# Patient Record
Sex: MALE | Race: WHITE | Employment: FULL TIME
[De-identification: names, ages, dates, MRNs, and addresses within clinical notes are randomized per-mention and may not be internally consistent; named-entity substitution may affect disease eponyms.]

---

## 2017-02-02 ENCOUNTER — OFFICE VISIT (OUTPATIENT)
Dept: FAMILY MEDICINE CLINIC | Facility: CLINIC | Age: 32
End: 2017-02-02

## 2017-02-02 VITALS
BODY MASS INDEX: 35.22 KG/M2 | WEIGHT: 246 LBS | DIASTOLIC BLOOD PRESSURE: 80 MMHG | OXYGEN SATURATION: 98 % | TEMPERATURE: 98.3 F | HEIGHT: 70 IN | SYSTOLIC BLOOD PRESSURE: 124 MMHG | HEART RATE: 70 BPM

## 2017-02-02 DIAGNOSIS — R20.0 LEFT LEG NUMBNESS: ICD-10-CM

## 2017-02-02 DIAGNOSIS — S86.912A MUSCLE STRAIN OF LOWER LEG, LEFT, INITIAL ENCOUNTER: Primary | ICD-10-CM

## 2017-02-02 PROCEDURE — 99213 OFFICE O/P EST LOW 20 MIN: CPT | Performed by: NURSE PRACTITIONER

## 2017-02-02 RX ORDER — MELOXICAM 15 MG/1
15 TABLET ORAL DAILY
Qty: 14 TABLET | Refills: 0 | Status: SHIPPED | OUTPATIENT
Start: 2017-02-02 | End: 2017-03-30 | Stop reason: ALTCHOICE

## 2017-02-03 ENCOUNTER — TELEPHONE (OUTPATIENT)
Dept: FAMILY MEDICINE CLINIC | Facility: CLINIC | Age: 32
End: 2017-02-03

## 2017-03-13 ENCOUNTER — HOSPITAL ENCOUNTER (OUTPATIENT)
Dept: PHYSICAL THERAPY | Age: 32
Setting detail: THERAPIES SERIES
Discharge: HOME | End: 2017-03-13
Payer: COMMERCIAL

## 2017-03-13 PROCEDURE — 97140 MANUAL THERAPY 1/> REGIONS: CPT

## 2017-03-13 PROCEDURE — 97110 THERAPEUTIC EXERCISES: CPT

## 2017-03-30 ENCOUNTER — OFFICE VISIT (OUTPATIENT)
Dept: FAMILY MEDICINE CLINIC | Age: 32
End: 2017-03-30
Payer: COMMERCIAL

## 2017-03-30 VITALS
HEART RATE: 64 BPM | TEMPERATURE: 98.4 F | BODY MASS INDEX: 34.5 KG/M2 | SYSTOLIC BLOOD PRESSURE: 110 MMHG | OXYGEN SATURATION: 97 % | DIASTOLIC BLOOD PRESSURE: 80 MMHG | HEIGHT: 70 IN | WEIGHT: 241 LBS

## 2017-03-30 DIAGNOSIS — G57.12 MERALGIA PARESTHETICA, LEFT: Primary | ICD-10-CM

## 2017-03-30 PROCEDURE — 99213 OFFICE O/P EST LOW 20 MIN: CPT | Performed by: NURSE PRACTITIONER

## 2017-03-30 RX ORDER — PREDNISONE 20 MG/1
40 TABLET ORAL DAILY
Qty: 10 TABLET | Refills: 0 | Status: SHIPPED | OUTPATIENT
Start: 2017-03-30 | End: 2017-04-04

## 2017-03-30 ASSESSMENT — ENCOUNTER SYMPTOMS
DIARRHEA: 0
CONSTIPATION: 0
BLOOD IN STOOL: 0

## 2017-04-03 ENCOUNTER — TELEPHONE (OUTPATIENT)
Dept: NEUROLOGY | Age: 32
End: 2017-04-03

## 2017-04-10 ENCOUNTER — OFFICE VISIT (OUTPATIENT)
Dept: NEUROLOGY | Age: 32
End: 2017-04-10
Payer: COMMERCIAL

## 2017-04-10 VITALS
RESPIRATION RATE: 18 BRPM | BODY MASS INDEX: 34.65 KG/M2 | SYSTOLIC BLOOD PRESSURE: 132 MMHG | HEART RATE: 76 BPM | DIASTOLIC BLOOD PRESSURE: 81 MMHG | HEIGHT: 70 IN | WEIGHT: 242 LBS

## 2017-04-10 DIAGNOSIS — R20.2 PARESTHESIA OF LEFT LEG: ICD-10-CM

## 2017-04-10 PROCEDURE — 99214 OFFICE O/P EST MOD 30 MIN: CPT | Performed by: PSYCHIATRY & NEUROLOGY

## 2017-04-11 ENCOUNTER — HOSPITAL ENCOUNTER (OUTPATIENT)
Age: 32
Discharge: HOME | End: 2017-04-11
Payer: COMMERCIAL

## 2017-04-11 DIAGNOSIS — R20.2 PARESTHESIA OF LEFT LEG: ICD-10-CM

## 2017-04-11 LAB
ABSOLUTE EOS #: 0.2 K/UL (ref 0–0.4)
ABSOLUTE LYMPH #: 2.2 K/UL (ref 0.9–2.5)
ABSOLUTE MONO #: 0.5 K/UL (ref 0–1)
ALBUMIN SERPL-MCNC: 4.2 G/DL (ref 3.5–5.2)
ALBUMIN/GLOBULIN RATIO: ABNORMAL (ref 1–2.5)
ALP BLD-CCNC: 74 U/L (ref 40–129)
ALT SERPL-CCNC: <5 U/L (ref 5–41)
ANION GAP SERPL CALCULATED.3IONS-SCNC: 12 MMOL/L (ref 9–17)
AST SERPL-CCNC: 16 U/L
BASOPHILS # BLD: 0 % (ref 0–2)
BASOPHILS ABSOLUTE: 0 K/UL (ref 0–0.2)
BILIRUB SERPL-MCNC: 1.45 MG/DL (ref 0.3–1.2)
BUN BLDV-MCNC: 18 MG/DL (ref 6–20)
BUN/CREAT BLD: 16 (ref 9–20)
CALCIUM SERPL-MCNC: 9.3 MG/DL (ref 8.6–10.4)
CHLORIDE BLD-SCNC: 101 MMOL/L (ref 98–107)
CO2: 27 MMOL/L (ref 20–31)
CREAT SERPL-MCNC: 1.13 MG/DL (ref 0.7–1.2)
DIFFERENTIAL TYPE: YES
EOSINOPHILS RELATIVE PERCENT: 2 % (ref 0–5)
ESTIMATED AVERAGE GLUCOSE: 103 MG/DL
FOLATE: 15.4 NG/ML
GFR AFRICAN AMERICAN: >60 ML/MIN
GFR NON-AFRICAN AMERICAN: >60 ML/MIN
GFR SERPL CREATININE-BSD FRML MDRD: ABNORMAL ML/MIN/{1.73_M2}
GFR SERPL CREATININE-BSD FRML MDRD: ABNORMAL ML/MIN/{1.73_M2}
GLUCOSE BLD-MCNC: 92 MG/DL (ref 70–99)
HAV IGM SER IA-ACNC: NONREACTIVE
HBA1C MFR BLD: 5.2 % (ref 4.8–5.9)
HCT VFR BLD CALC: 45.7 % (ref 41–53)
HEMOGLOBIN: 15.4 G/DL (ref 13.5–17.5)
HEPATITIS B CORE IGM ANTIBODY: NONREACTIVE
HEPATITIS B SURFACE ANTIGEN: NONREACTIVE
HEPATITIS C ANTIBODY: NONREACTIVE
LYMPHOCYTES # BLD: 31 % (ref 13–44)
MCH RBC QN AUTO: 29 PG (ref 26–34)
MCHC RBC AUTO-ENTMCNC: 33.8 G/DL (ref 31–37)
MCV RBC AUTO: 85.8 FL (ref 80–100)
MONOCYTES # BLD: 7 % (ref 5–9)
PDW BLD-RTO: 13.5 % (ref 12.1–15.2)
PLATELET # BLD: 215 K/UL (ref 140–450)
PLATELET ESTIMATE: NORMAL
PMV BLD AUTO: NORMAL FL (ref 6–12)
POTASSIUM SERPL-SCNC: 4.4 MMOL/L (ref 3.7–5.3)
RBC # BLD: 5.32 M/UL (ref 4.5–5.9)
RBC # BLD: NORMAL 10*6/UL
SEG NEUTROPHILS: 60 % (ref 39–75)
SEGMENTED NEUTROPHILS ABSOLUTE COUNT: 4.2 K/UL (ref 2.1–6.5)
SODIUM BLD-SCNC: 140 MMOL/L (ref 135–144)
TOTAL PROTEIN: 6.7 G/DL (ref 6.4–8.3)
TSH SERPL DL<=0.05 MIU/L-ACNC: 1.94 MIU/L (ref 0.3–5)
VITAMIN B-12: 513 PG/ML (ref 211–946)
WBC # BLD: 7.2 K/UL (ref 3.5–11)
WBC # BLD: NORMAL 10*3/UL

## 2017-04-11 PROCEDURE — 80053 COMPREHEN METABOLIC PANEL: CPT

## 2017-04-11 PROCEDURE — 84207 ASSAY OF VITAMIN B-6: CPT

## 2017-04-11 PROCEDURE — 82746 ASSAY OF FOLIC ACID SERUM: CPT

## 2017-04-11 PROCEDURE — 82607 VITAMIN B-12: CPT

## 2017-04-11 PROCEDURE — 83036 HEMOGLOBIN GLYCOSYLATED A1C: CPT

## 2017-04-11 PROCEDURE — 82652 VIT D 1 25-DIHYDROXY: CPT

## 2017-04-11 PROCEDURE — 80074 ACUTE HEPATITIS PANEL: CPT

## 2017-04-11 PROCEDURE — 84443 ASSAY THYROID STIM HORMONE: CPT

## 2017-04-11 PROCEDURE — 85025 COMPLETE CBC W/AUTO DIFF WBC: CPT

## 2017-04-11 PROCEDURE — 36415 COLL VENOUS BLD VENIPUNCTURE: CPT

## 2017-04-13 LAB — VITAMIN D 1,25-DIHYDROXY: 50.1 PG/ML (ref 19.9–79.3)

## 2017-04-16 LAB — VITAMIN B6: 109 NMOL/L (ref 20–125)

## 2017-04-18 ENCOUNTER — HOSPITAL ENCOUNTER (OUTPATIENT)
Dept: MRI IMAGING | Age: 32
Discharge: HOME | End: 2017-04-18
Payer: COMMERCIAL

## 2017-04-18 DIAGNOSIS — R20.2 PARESTHESIA OF LEFT LEG: ICD-10-CM

## 2017-04-18 DIAGNOSIS — R20.0 NUMBNESS IN LEFT LEG: ICD-10-CM

## 2017-04-18 PROCEDURE — 6360000004 HC RX CONTRAST MEDICATION: Performed by: PSYCHIATRY & NEUROLOGY

## 2017-04-18 PROCEDURE — A9579 GAD-BASE MR CONTRAST NOS,1ML: HCPCS | Performed by: PSYCHIATRY & NEUROLOGY

## 2017-04-18 PROCEDURE — 72158 MRI LUMBAR SPINE W/O & W/DYE: CPT

## 2017-04-18 RX ADMIN — GADOPENTETATE DIMEGLUMINE 20 ML: 469.01 INJECTION INTRAVENOUS at 14:39

## 2019-05-24 ENCOUNTER — OFFICE VISIT (OUTPATIENT)
Dept: PRIMARY CARE CLINIC | Age: 34
End: 2019-05-24
Payer: COMMERCIAL

## 2019-05-24 VITALS
TEMPERATURE: 98 F | OXYGEN SATURATION: 94 % | SYSTOLIC BLOOD PRESSURE: 136 MMHG | WEIGHT: 234 LBS | DIASTOLIC BLOOD PRESSURE: 86 MMHG | BODY MASS INDEX: 33.58 KG/M2 | HEART RATE: 70 BPM

## 2019-05-24 DIAGNOSIS — J06.9 VIRAL UPPER RESPIRATORY TRACT INFECTION: Primary | ICD-10-CM

## 2019-05-24 PROCEDURE — 99213 OFFICE O/P EST LOW 20 MIN: CPT | Performed by: NURSE PRACTITIONER

## 2019-05-24 ASSESSMENT — ENCOUNTER SYMPTOMS
CHEST TIGHTNESS: 0
RHINORRHEA: 1
SHORTNESS OF BREATH: 0
SINUS PAIN: 0
WHEEZING: 0
NAUSEA: 0
SORE THROAT: 1
VOMITING: 0
SINUS PRESSURE: 0
DIARRHEA: 0
COUGH: 1
ABDOMINAL PAIN: 0

## 2019-05-24 NOTE — PROGRESS NOTES
[address] [facility] WALK-IN [facility] [id number]  Dept: [phone]  Dept Fax: [phone]     Krys Jones is a 26 y.o. male who presents to the [facility] in Care today for hismedical conditions/complaints as noted below. Krys Jones is c/o of Pharyngitis (sore throat, cough, left ear pain, sinus pressure x 4 days)      HPI:     Pharyngitis   This is a new problem. The current episode started in the past 7 days (Started 4 days ago with sore throat, dry cough, nasal congestion, runny nose with pressure in eyes and \"popping in left ear\". Reports that one of his children was sick but is doing better now. ). The problem occurs constantly. The problem has been gradually worsening. Associated symptoms include congestion, coughing, fatigue (last couple of days), headaches and a sore throat. Pertinent negatives include no abdominal pain, chest pain, chills, diaphoresis, fever, myalgias, nausea, rash or vomiting. Associated symptoms comments: Sore on tongue, doesn't hurt but bleeds at times after brushing tongue. . The symptoms are aggravated by drinking and eating. Treatments tried: Dayquil for couple of days. The treatment provided mild relief. History reviewed. No pertinent past medical history. Current Outpatient Medications   Medication Sig Dispense Refill    Pseudoephedrine-DM-GG 60- MG TABS Take 1 tablet by mouth every 6 hours as needed (For cough, congestion and/or sinus pressure.) 28 tablet 0    Multiple Vitamins-Minerals (MULTIVITAMIN PO) Take by mouth       No current facility-administered medications for this visit. No Known Allergies    Subjective:     Review of Systems   Constitutional: Positive for appetite change and fatigue (last couple of days). Negative for chills, diaphoresis and fever. HENT: Positive for congestion, mouth sores (sore on tongue), postnasal drip, rhinorrhea and sore throat.  Negative for ear discharge, ear pain, sinus pressure and sinus pain. Respiratory: Positive for cough. Negative for chest tightness, shortness of breath and wheezing. Cardiovascular: Negative for chest pain. Gastrointestinal: Negative for abdominal pain, diarrhea, nausea and vomiting. Musculoskeletal: Negative for myalgias. Skin: Negative for rash and wound. Neurological: Positive for headaches. Negative for dizziness and light-headedness. Objective:      Physical Exam   Constitutional: He is oriented to person, place, and time. Vital signs are normal. He appears well-developed and well-nourished. He is cooperative. He does not appear ill. No distress. HENT:   Head: Normocephalic and atraumatic. Right Ear: Hearing, external ear and ear canal normal. No mastoid tenderness. Tympanic membrane is not injected, not erythematous and not bulging. A middle ear effusion (clear fluid with bubbles) is present. Left Ear: Hearing, external ear and ear canal normal. No mastoid tenderness. Tympanic membrane is not injected, not erythematous and not bulging. A middle ear effusion (pale white fluid with bubbles) is present. Nose: Mucosal edema and rhinorrhea present. Right sinus exhibits no maxillary sinus tenderness and no frontal sinus tenderness. Left sinus exhibits no maxillary sinus tenderness and no frontal sinus tenderness. Mouth/Throat: Uvula is midline, oropharynx is clear and moist and mucous membranes are normal. Oral lesions (small lesion in center of tongue, no bleeding.) present. No oropharyngeal exudate, posterior oropharyngeal edema, posterior oropharyngeal erythema or tonsillar abscesses. Eyes: Pupils are equal, round, and reactive to light. Conjunctivae are normal. Right eye exhibits no discharge. Left eye exhibits no discharge. Cardiovascular: Normal rate, regular rhythm, S1 normal, S2 normal, normal heart sounds and intact distal pulses. Exam reveals no gallop and no friction rub.    No murmur heard.  Pulmonary/Chest: Effort normal and breath sounds normal. No accessory muscle usage. No respiratory distress. He has no decreased breath sounds. He has no wheezes. He has no rhonchi. He has no rales. Occasional dry cough. Breath sounds clear B/L anterior and posterior lobes. Chest expansion symmetrical.  No audible wheezing or respiratory distress. No rales or rhonchi. Musculoskeletal: Normal range of motion. Lymphadenopathy:     He has no cervical adenopathy. Right cervical: No superficial cervical and no posterior cervical adenopathy present. Left cervical: No superficial cervical and no posterior cervical adenopathy present. Neurological: He is alert and oriented to person, place, and time. Skin: Skin is warm and dry. No rash noted. He is not diaphoretic. No erythema. No pallor. Psychiatric: He has a normal mood and affect. His behavior is normal.   Nursing note and vitals reviewed. /86   Pulse 70   Temp 98 °F (36.7 °C)   Wt 234 lb (106.1 kg)   SpO2 94%   BMI 33.58 kg/m²     Assessment:      Diagnosis Orders   1. Viral upper respiratory tract infection  Pseudoephedrine-DM-GG 60- MG TABS       Plan:      Return if symptoms worsen or fail to improve, for Resume all previous medications as directed. Orders Placed This Encounter   Medications    Pseudoephedrine-DM-GG 60- MG TABS     Sig: Take 1 tablet by mouth every 6 hours as needed (For cough, congestion and/or sinus pressure.)     Dispense:  28 tablet     Refill:  0      · Practice meticulous handwashing and cover cough to prevent spread of infection  · Encouraged to increase fluids and rest  · Tylenol/Ibuprofen OTC PRN for pain, discomfort or fever as directed on package  · Capmist DM as prescribed as needed for cough, congestion, sinus pain or pressure.   · Warm salt water gargles for sore throat  · Cool mist humidifier  · Hot tea with honey and lemon for cough PRN  · Patient instructions given

## 2019-05-24 NOTE — PATIENT INSTRUCTIONS
SURVEY:    You may be receiving a survey from [employer] regarding your visit today. Please complete the survey to enable us to provide the highest quality of care to you and your family. If you cannot score us a very good on any question, please call the office to discuss how we could of made your experience a very good one. Thank you. Patient Education        Upper Respiratory Infection (Cold): Care Instructions  Your Care Instructions    An upper respiratory infection, or URI, is an infection of the nose, sinuses, or throat. URIs are spread by coughs, sneezes, and direct contact. The common cold is the most frequent kind of URI. The flu and sinus infections are other kinds of URIs. Almost all URIs are caused by viruses. Antibiotics won't cure them. But you can treat most infections with home care. This may include drinking lots of fluids and taking over-the-counter pain medicine. You will probably feel better in 4 to 10 days. The doctor has checked you carefully, but problems can develop later. If you notice any problems or new symptoms, get medical treatment right away. Follow-up care is a key part of your treatment and safety. Be sure to make and go to all appointments, and call your doctor if you are having problems. It's also a good idea to know your test results and keep a list of the medicines you take. How can you care for yourself at home? · To prevent dehydration, drink plenty of fluids, enough so that your urine is light yellow or clear like water. Choose water and other caffeine-free clear liquids until you feel better. If you have kidney, heart, or liver disease and have to limit fluids, talk with your doctor before you increase the amount of fluids you drink. · Take an over-the-counter pain medicine, such as acetaminophen (Tylenol), ibuprofen (Advil, Motrin), or naproxen (Aleve). Read and follow all instructions on the label.   · Before you use cough and cold medicines, check the of this information. · Practice meticulous handwashing and cover cough to prevent spread of infection  · Encouraged to increase fluids and rest  · Tylenol/Ibuprofen OTC PRN for pain, discomfort or fever as directed on package  · Capmist DM as prescribed as needed for cough, congestion, sinus pain or pressure. · Warm salt water gargles for sore throat  · Cool mist humidifier  · Hot tea with honey and lemon for cough PRN  · Patient instructions given for upper respiratory infection. · To ER or call 911 if any difficulty breathing, shortness of breath, inability to swallow, hives, rash, facial/tongue swelling or temp greater than 103 degrees. · Follow up with PCP or Walk in Care as needed if symptoms worsen or do not improve.

## 2019-08-15 ENCOUNTER — OFFICE VISIT (OUTPATIENT)
Dept: FAMILY MEDICINE CLINIC | Age: 34
End: 2019-08-15
Payer: COMMERCIAL

## 2019-08-15 VITALS
DIASTOLIC BLOOD PRESSURE: 80 MMHG | SYSTOLIC BLOOD PRESSURE: 122 MMHG | TEMPERATURE: 98.3 F | HEART RATE: 64 BPM | BODY MASS INDEX: 32.64 KG/M2 | HEIGHT: 70 IN | WEIGHT: 228 LBS | OXYGEN SATURATION: 98 %

## 2019-08-15 DIAGNOSIS — S86.912A STRAIN OF LEFT KNEE, INITIAL ENCOUNTER: Primary | ICD-10-CM

## 2019-08-15 DIAGNOSIS — M54.16 LUMBAR RADICULOPATHY: ICD-10-CM

## 2019-08-15 PROCEDURE — 99213 OFFICE O/P EST LOW 20 MIN: CPT | Performed by: NURSE PRACTITIONER

## 2019-08-15 RX ORDER — MELOXICAM 7.5 MG/1
7.5 TABLET ORAL DAILY
Qty: 90 TABLET | Refills: 1 | Status: SHIPPED | OUTPATIENT
Start: 2019-08-15 | End: 2020-06-30 | Stop reason: ALTCHOICE

## 2019-08-15 ASSESSMENT — PATIENT HEALTH QUESTIONNAIRE - PHQ9
SUM OF ALL RESPONSES TO PHQ QUESTIONS 1-9: 0
2. FEELING DOWN, DEPRESSED OR HOPELESS: 0
SUM OF ALL RESPONSES TO PHQ QUESTIONS 1-9: 0
SUM OF ALL RESPONSES TO PHQ9 QUESTIONS 1 & 2: 0
1. LITTLE INTEREST OR PLEASURE IN DOING THINGS: 0

## 2019-08-15 ASSESSMENT — ENCOUNTER SYMPTOMS
BOWEL INCONTINENCE: 0
BACK PAIN: 1
COUGH: 0
DIARRHEA: 0
NAUSEA: 0
SHORTNESS OF BREATH: 0
VOMITING: 0

## 2019-08-15 NOTE — PROGRESS NOTES
Medication Sig Start Date End Date Taking? Authorizing Provider   meloxicam (MOBIC) 7.5 MG tablet Take 1 tablet by mouth daily 8/15/19  Yes Kaity Vaughn, APRN - CNP   Multiple Vitamins-Minerals (MULTIVITAMIN PO) Take by mouth    Historical Provider, MD        Allergies:       Patient has no known allergies. Social History:     Tobacco:    reports that he has never smoked. He has never used smokeless tobacco.  Alcohol:      reports that he drinks alcohol. Drug Use:  reports that he does not use drugs. Family History:      No family history on file. Review of Systems:         Review of Systems   Constitutional: Negative for chills and fever. Respiratory: Negative for cough and shortness of breath. Cardiovascular: Negative for chest pain and palpitations. Gastrointestinal: Negative for bowel incontinence, diarrhea, nausea and vomiting. Genitourinary: Negative for bladder incontinence. Musculoskeletal: Positive for back pain. Neurological: Negative for dizziness, tingling, seizures, numbness and headaches. Physical Exam:     Vitals:  /80   Pulse 64   Temp 98.3 °F (36.8 °C) (Oral)   Ht 5' 10\" (1.778 m)   Wt 228 lb (103.4 kg)   SpO2 98%   BMI 32.71 kg/m²       Physical Exam   Constitutional: Vital signs are normal. He appears well-developed and well-nourished. He is cooperative. Cardiovascular: Normal rate, regular rhythm, S1 normal and S2 normal.   Pulmonary/Chest: Effort normal and breath sounds normal. No respiratory distress. Abdominal: Soft. Bowel sounds are normal. There is no tenderness. Musculoskeletal:        Left knee: He exhibits no LCL laxity and no MCL laxity. No MCL, no LCL and no patellar tendon tenderness noted. Left patella not loose or mobile. Neg Maldonado test.    Skin: Skin is warm, dry and intact. Nursing note and vitals reviewed.             Data:     Lab Results   Component Value Date     04/11/2017    K 4.4 04/11/2017     have questions about a medical condition or this instruction, always ask your healthcare professional. [address] any warranty or liability for your use of this information. Patient Education        Low Back Pain: Exercises  Introduction  Here are some examples of exercises for you to try. The exercises may be suggested for a condition or for rehabilitation. Start each exercise slowly. Ease off the exercises if you start to have pain. You will be told when to start these exercises and which ones will work best for you. How to do the exercises  Press-up    1. Lie on your stomach, supporting your body with your forearms. 2. Press your elbows down into the floor to raise your upper back. As you do this, relax your stomach muscles and allow your back to arch without using your back muscles. As your press up, do not let your hips or pelvis come off the floor. 3. Hold for 15 to 30 seconds, then relax. 4. Repeat 2 to 4 times. Alternate arm and leg (bird dog) exercise    1. Start on the floor, on your hands and knees. 2. Tighten your belly muscles. 3. Raise one leg off the floor, and hold it straight out behind you. Be careful not to let your hip drop down, because that will twist your trunk. 4. Hold for about 6 seconds, then lower your leg and switch to the other leg. 5. Repeat 8 to 12 times on each leg. 6. Over time, work up to holding for 10 to 30 seconds each time. 7. If you feel stable and secure with your leg raised, try raising the opposite arm straight out in front of you at the same time. Knee-to-chest exercise    1. Lie on your back with your knees bent and your feet flat on the floor. 2. Bring one knee to your chest, keeping the other foot flat on the floor (or keeping the other leg straight, whichever feels better on your lower back). 3. Keep your lower back pressed to the floor. Hold for at least 15 to 30 seconds.   4. Relax, and lower the knee to the starting 15 to 30 seconds. Do not arch your back, point your toes, or bend either knee. Keep one heel touching the floor and the other heel touching the wall. 4. Repeat with your other leg. 5. Do 2 to 4 times for each leg. Hip flexor stretch    1. Kneel on the floor with one knee bent and one leg behind you. Place your forward knee over your foot. Keep your other knee touching the floor. 2. Slowly push your hips forward until you feel a stretch in the upper thigh of your rear leg. 3. Hold the stretch for at least 15 to 30 seconds. Repeat with your other leg. 4. Do 2 to 4 times on each side. Wall sit    1. Stand with your back 10 to 12 inches away from a wall. 2. Lean into the wall until your back is flat against it. 3. Slowly slide down until your knees are slightly bent, pressing your lower back into the wall. 4. Hold for about 6 seconds, then slide back up the wall. 5. Repeat 8 to 12 times. Follow-up care is a key part of your treatment and safety. Be sure to make and go to all appointments, and call your doctor if you are having problems. It's also a good idea to know your test results and keep a list of the medicines you take. Where can you learn more? Go to [email or web address]. org and sign in to your [employer] account. Enter I899 in the [facility] box to learn more about \"Low Back Pain: Exercises. \"     If you do not have an account, please click on the \"Sign Up Now\" link. Current as of: September 20, 2018  Content Version: 12.1  © 7764-5585 Healthwise, Incorporated. Care instructions adapted under license by [facility] ([facility]). If you have questions about a medical condition or this instruction, always ask your healthcare professional. [place] [address] any warranty or liability for your use of this information.              Electronically signed by GARO Berry CNP on 8/16/2019 at 5:39 PM           Completed Refills      Requested Prescriptions

## 2019-08-15 NOTE — PATIENT INSTRUCTIONS
SURVEY:    You may be receiving a survey from [employer] regarding your visit today. Please complete the survey to enable us to provide the highest quality of care to you and your family. If you cannot score us a very good on any question, please call the office to discuss how we could have made your experience a very good one. Thank you. Patient Education        Lateral Collateral Ligament Sprain: Rehab Exercises  Introduction  Here are some examples of exercises for you to try. The exercises may be suggested for a condition or for rehabilitation. Start each exercise slowly. Ease off the exercises if you start to have pain. You will be told when to start these exercises and which ones will work best for you. How to do the exercises  Knee flexion with heel slide    1. Lie on your back with your knees bent. 2. Slide your heel back by bending your affected knee as far as you can. Then hook your other foot around your ankle to help pull your heel even farther back. 3. Hold for about 6 seconds, then rest for up to 10 seconds. 4. Repeat 8 to 12 times. Heel slides on a wall    1. Lie on the floor close enough to a wall so that you can place both legs up on the wall. Your hips should be as close to the wall as is comfortable for you. 2. Start with both feet resting on the wall. Slowly let the foot of your affected leg slide down the wall until you feel a stretch in your knee. 3. Hold for 15 to 30 seconds. 4. Then slowly slide your foot up to where you started. 5. Repeat 2 to 4 times. Quad sets    1. Sit with your affected leg straight and supported on the floor or a firm bed. Place a small, rolled-up towel under your knee. Your other leg should be bent, with that foot flat on the floor. 2. Tighten the thigh muscles of your affected leg by pressing the back of your knee down into the towel. 3. Hold for about 6 seconds, then rest for up to 10 seconds. 4. Repeat 8 to 12 times.     Short-arc quad    1. Lie on your back with your knees bent over a foam roll or a large rolled-up towel. 2. Lift the lower part of your affected leg and straighten your knee by tightening your thigh muscle. Keep the bottom of your knee on the foam roll or rolled-up towel. 3. Hold your knee straight for about 6 seconds, then slowly bend your knee and lower your leg back to the floor. Rest for up to 10 seconds between repetitions. 4. Repeat 8 to 12 times. Straight-leg raises to the front    1. Lie on your back with your good knee bent so that your foot rests flat on the floor. Your affected leg should be straight. Make sure that your low back has a normal curve. You should be able to slip your hand in between the floor and the small of your back, with your palm touching the floor and your back touching the back of your hand. 2. Tighten the thigh muscles in your affected leg by pressing the back of your knee flat down to the floor. Hold your knee straight. 3. Keeping the thigh muscles tight and your leg straight, lift your affected leg up so that your heel is about 12 inches off the floor. Hold for about 6 seconds, then lower slowly. 4. Relax for up to 10 seconds between repetitions. 5. Repeat 8 to 12 times. Hamstring set (heel dig)    1. Sit with your affected leg bent. Your good leg should be straight and supported on the floor. 2. Tighten the muscles on the back of your bent leg (hamstring) by pressing your heel into the floor. 3. Hold for about 6 seconds, then rest for up to 10 seconds. 4. Repeat 8 to 12 times. Hip adduction    1. Sit on the floor with your knees bent. 2. Place a pillow between your knees. 3. Put your hands slightly behind your hips for support. 4. Squeeze the pillow by tightening the muscles on the inside of your thighs. 5. Hold for 6 seconds, then rest for up to 10 seconds. 6. Repeat 8 to 12 times. Hip abduction    1.  Sit on the floor with your affected knee close to a more about \"Lateral Collateral Ligament Sprain: Rehab Exercises. \"     If you do not have an account, please click on the \"Sign Up Now\" link. Current as of: September 20, 2018  Content Version: 12.1  © 9521-7460 Healthwise, Incorporated. Care instructions adapted under license by [facility] ([facility]). If you have questions about a medical condition or this instruction, always ask your healthcare professional. [address] any warranty or liability for your use of this information. Patient Education        Low Back Pain: Exercises  Introduction  Here are some examples of exercises for you to try. The exercises may be suggested for a condition or for rehabilitation. Start each exercise slowly. Ease off the exercises if you start to have pain. You will be told when to start these exercises and which ones will work best for you. How to do the exercises  Press-up    1. Lie on your stomach, supporting your body with your forearms. 2. Press your elbows down into the floor to raise your upper back. As you do this, relax your stomach muscles and allow your back to arch without using your back muscles. As your press up, do not let your hips or pelvis come off the floor. 3. Hold for 15 to 30 seconds, then relax. 4. Repeat 2 to 4 times. Alternate arm and leg (bird dog) exercise    1. Start on the floor, on your hands and knees. 2. Tighten your belly muscles. 3. Raise one leg off the floor, and hold it straight out behind you. Be careful not to let your hip drop down, because that will twist your trunk. 4. Hold for about 6 seconds, then lower your leg and switch to the other leg. 5. Repeat 8 to 12 times on each leg. 6. Over time, work up to holding for 10 to 30 seconds each time. 7. If you feel stable and secure with your leg raised, try raising the opposite arm straight out in front of you at the same time. Knee-to-chest exercise    1.  Lie on your back with your knees bent and your feet this instruction, always ask your healthcare professional. [place] [address] any warranty or liability for your use of this information.

## 2020-06-29 ENCOUNTER — TELEPHONE (OUTPATIENT)
Dept: FAMILY MEDICINE CLINIC | Age: 35
End: 2020-06-29

## 2020-06-29 NOTE — TELEPHONE ENCOUNTER
Pt calling in asking for advice on what to do. Pt went to an event on Saturday and he was informed this morning that someone tested positive that was there, pt said that he is only concerned because he has a 11year old niece that he saw on Sunday and she has cystic fibrosis so he really doesn't want to get her sick. Patient is not sure if it is to early to test him or if he even needs the testing he is just curious.   Please advise  Pt is also scheduled for in office appointment tomorrow should I change to virtual?

## 2020-06-30 ENCOUNTER — OFFICE VISIT (OUTPATIENT)
Dept: FAMILY MEDICINE CLINIC | Age: 35
End: 2020-06-30
Payer: COMMERCIAL

## 2020-06-30 VITALS
WEIGHT: 239 LBS | HEART RATE: 60 BPM | BODY MASS INDEX: 34.29 KG/M2 | SYSTOLIC BLOOD PRESSURE: 124 MMHG | DIASTOLIC BLOOD PRESSURE: 82 MMHG | OXYGEN SATURATION: 98 %

## 2020-06-30 PROCEDURE — G8427 DOCREV CUR MEDS BY ELIG CLIN: HCPCS | Performed by: NURSE PRACTITIONER

## 2020-06-30 PROCEDURE — 99213 OFFICE O/P EST LOW 20 MIN: CPT | Performed by: NURSE PRACTITIONER

## 2020-06-30 PROCEDURE — 1036F TOBACCO NON-USER: CPT | Performed by: NURSE PRACTITIONER

## 2020-06-30 PROCEDURE — G8419 CALC BMI OUT NRM PARAM NOF/U: HCPCS | Performed by: NURSE PRACTITIONER

## 2020-06-30 RX ORDER — MELOXICAM 15 MG/1
15 TABLET ORAL DAILY
Qty: 90 TABLET | Refills: 1 | Status: SHIPPED | OUTPATIENT
Start: 2020-06-30 | End: 2020-09-29 | Stop reason: SDUPTHER

## 2020-06-30 SDOH — ECONOMIC STABILITY: TRANSPORTATION INSECURITY
IN THE PAST 12 MONTHS, HAS THE LACK OF TRANSPORTATION KEPT YOU FROM MEDICAL APPOINTMENTS OR FROM GETTING MEDICATIONS?: NO

## 2020-06-30 SDOH — ECONOMIC STABILITY: TRANSPORTATION INSECURITY
IN THE PAST 12 MONTHS, HAS LACK OF TRANSPORTATION KEPT YOU FROM MEETINGS, WORK, OR FROM GETTING THINGS NEEDED FOR DAILY LIVING?: NO

## 2020-06-30 SDOH — ECONOMIC STABILITY: FOOD INSECURITY: WITHIN THE PAST 12 MONTHS, YOU WORRIED THAT YOUR FOOD WOULD RUN OUT BEFORE YOU GOT MONEY TO BUY MORE.: NEVER TRUE

## 2020-06-30 SDOH — ECONOMIC STABILITY: INCOME INSECURITY: HOW HARD IS IT FOR YOU TO PAY FOR THE VERY BASICS LIKE FOOD, HOUSING, MEDICAL CARE, AND HEATING?: NOT HARD AT ALL

## 2020-06-30 SDOH — ECONOMIC STABILITY: FOOD INSECURITY: WITHIN THE PAST 12 MONTHS, THE FOOD YOU BOUGHT JUST DIDN'T LAST AND YOU DIDN'T HAVE MONEY TO GET MORE.: NEVER TRUE

## 2020-06-30 ASSESSMENT — ENCOUNTER SYMPTOMS
BOWEL INCONTINENCE: 0
DIARRHEA: 0
VOMITING: 0
COUGH: 0
NAUSEA: 0
SHORTNESS OF BREATH: 0
BACK PAIN: 1

## 2020-06-30 ASSESSMENT — PATIENT HEALTH QUESTIONNAIRE - PHQ9
SUM OF ALL RESPONSES TO PHQ QUESTIONS 1-9: 0
2. FEELING DOWN, DEPRESSED OR HOPELESS: 0
1. LITTLE INTEREST OR PLEASURE IN DOING THINGS: 0
SUM OF ALL RESPONSES TO PHQ QUESTIONS 1-9: 0
SUM OF ALL RESPONSES TO PHQ9 QUESTIONS 1 & 2: 0

## 2020-06-30 NOTE — PROGRESS NOTES
HPI Notes    Name: Merceda Libman  : 1985         Chief Complaint:     Chief Complaint   Patient presents with    Back Pain     Here today for chronic back pain, needs [facility] paperwork renewed. History of Present Illness:        Back Pain   This is a chronic problem. The current episode started more than 1 year ago. The problem occurs intermittently. The pain is present in the lumbar spine. The quality of the pain is described as aching. The pain does not radiate. The pain is moderate. The pain is worse during the day. The symptoms are aggravated by bending, position and twisting. Associated symptoms include leg pain (off and on). Pertinent negatives include no bladder incontinence, bowel incontinence, chest pain, fever, headaches, numbness, perianal numbness or tingling. Risk factors include obesity. He has tried nothing for the symptoms. Past Medical History:     No past medical history on file. Reviewed all health maintenance requirements and ordered appropriate tests  Health Maintenance Due   Topic Date Due    Varicella vaccine (1 of 2 - 2-dose childhood series) 1984    HIV screen  1998    DTaP/Tdap/Td vaccine (1 - Tdap) 2002       Past Surgical History:     Past Surgical History:   Procedure Laterality Date    WRIST SURGERY Right         Medications:       Prior to Admission medications    Medication Sig Start Date End Date Taking? Authorizing Provider   meloxicam (MOBIC) 15 MG tablet Take 1 tablet by mouth daily 20  Yes Ernesto Angulo APRN - CNP   Multiple Vitamins-Minerals (MULTIVITAMIN PO) Take by mouth   Yes Historical Provider, MD        Allergies:       Patient has no known allergies. Social History:     Tobacco:    reports that he has never smoked. He has never used smokeless tobacco.  Alcohol:      reports current alcohol use. Drug Use:  reports no history of drug use. Family History:      No family history on file.     Review of Systems:

## 2020-09-29 ENCOUNTER — OFFICE VISIT (OUTPATIENT)
Dept: FAMILY MEDICINE CLINIC | Age: 35
End: 2020-09-29
Payer: COMMERCIAL

## 2020-09-29 VITALS
WEIGHT: 244 LBS | SYSTOLIC BLOOD PRESSURE: 124 MMHG | HEIGHT: 70 IN | BODY MASS INDEX: 34.93 KG/M2 | OXYGEN SATURATION: 98 % | HEART RATE: 64 BPM | DIASTOLIC BLOOD PRESSURE: 79 MMHG | TEMPERATURE: 97.9 F

## 2020-09-29 PROCEDURE — 99213 OFFICE O/P EST LOW 20 MIN: CPT | Performed by: NURSE PRACTITIONER

## 2020-09-29 PROCEDURE — G8419 CALC BMI OUT NRM PARAM NOF/U: HCPCS | Performed by: NURSE PRACTITIONER

## 2020-09-29 PROCEDURE — 1036F TOBACCO NON-USER: CPT | Performed by: NURSE PRACTITIONER

## 2020-09-29 PROCEDURE — G8427 DOCREV CUR MEDS BY ELIG CLIN: HCPCS | Performed by: NURSE PRACTITIONER

## 2020-09-29 RX ORDER — MELOXICAM 15 MG/1
15 TABLET ORAL DAILY
Qty: 90 TABLET | Refills: 1 | Status: SHIPPED | OUTPATIENT
Start: 2020-09-29 | End: 2021-03-11 | Stop reason: ALTCHOICE

## 2020-09-29 RX ORDER — TIZANIDINE 4 MG/1
4 TABLET ORAL NIGHTLY PRN
Qty: 30 TABLET | Refills: 0 | Status: SHIPPED | OUTPATIENT
Start: 2020-09-29 | End: 2021-03-11 | Stop reason: ALTCHOICE

## 2020-09-29 ASSESSMENT — ENCOUNTER SYMPTOMS
COUGH: 0
SHORTNESS OF BREATH: 0
BOWEL INCONTINENCE: 0
VOMITING: 0
BACK PAIN: 1
NAUSEA: 0
DIARRHEA: 0

## 2020-09-29 NOTE — PATIENT INSTRUCTIONS
SURVEY:    You may be receiving a survey from [employer] regarding your visit today. Please complete the survey to enable us to provide the highest quality of care to you and your family. If you cannot score us a very good on any question, please call the office to discuss how we could of made your experience a very good one. Thank you.

## 2020-09-29 NOTE — PROGRESS NOTES
HPI Notes    Name: Maulik Gallo  : 1985         Chief Complaint:     Chief Complaint   Patient presents with    Back Pain     Patient present today with lower back pain on left side pain started yesterday patient stated he was taking trash out        History of Present Illness:        Back Pain   This is a new problem. The current episode started yesterday. The problem occurs daily. The pain is present in the lumbar spine. The pain is moderate. The symptoms are aggravated by bending and twisting. Pertinent negatives include no bladder incontinence, bowel incontinence, chest pain, fever, headaches, leg pain, numbness, perianal numbness or tingling. He has tried nothing for the symptoms. Past Medical History:     No past medical history on file. Reviewed all health maintenance requirements and ordered appropriate tests  Health Maintenance Due   Topic Date Due    Varicella vaccine (1 of 2 - 2-dose childhood series) 1984    HIV screen  1998    DTaP/Tdap/Td vaccine (1 - Tdap) 2002    Flu vaccine (1) 2020       Past Surgical History:     Past Surgical History:   Procedure Laterality Date    WRIST SURGERY Right         Medications:       Prior to Admission medications    Medication Sig Start Date End Date Taking? Authorizing Provider   Multiple Vitamins-Minerals (MULTIVITAMIN PO) Take by mouth   Yes Historical Provider, MD   meloxicam (MOBIC) 15 MG tablet Take 1 tablet by mouth daily  Patient not taking: Reported on 2020   GARO Peterson CNP        Allergies:       Patient has no known allergies. Social History:     Tobacco:    reports that he has never smoked. He has never used smokeless tobacco.  Alcohol:      reports current alcohol use. Drug Use:  reports no history of drug use. Family History:      No family history on file. Review of Systems:         Review of Systems   Constitutional: Negative for chills and fever. Respiratory: Negative for cough and shortness of breath. Cardiovascular: Negative for chest pain and palpitations. Gastrointestinal: Negative for bowel incontinence, diarrhea, nausea and vomiting. Genitourinary: Negative for bladder incontinence. Musculoskeletal: Positive for back pain. Neurological: Negative for dizziness, tingling, seizures, numbness and headaches. Physical Exam:     Vitals:  /79 (Site: Right Upper Arm, Position: Sitting, Cuff Size: Large Adult)   Pulse 64   Temp 97.9 °F (36.6 °C) (Oral)   Ht 5' 10\" (1.778 m)   Wt 244 lb (110.7 kg)   SpO2 98%   BMI 35.01 kg/m²       Physical Exam  Vitals signs and nursing note reviewed. Constitutional:       Appearance: He is well-developed. Cardiovascular:      Rate and Rhythm: Normal rate and regular rhythm. Heart sounds: S1 normal and S2 normal.   Pulmonary:      Effort: Pulmonary effort is normal. No respiratory distress. Breath sounds: Normal breath sounds. Abdominal:      General: Bowel sounds are normal.      Palpations: Abdomen is soft. Tenderness: There is no abdominal tenderness. Musculoskeletal:      Lumbar back: He exhibits decreased range of motion, tenderness, pain and spasm. Back:       Comments:  tenderness to left paraspinal muscles   increased pain with forward bend at 40 degrees   increased pain with backward bend at 10 degrees  NO increase in pain with side to side bend  NO increased pain with truncal twisting  NO increased pain with straight leg raised test bilat  Foot and ankle strength 5/5 bilat  Knee raise strength 5/5 bilat   Skin:     General: Skin is warm and dry. Psychiatric:         Behavior: Behavior is cooperative.                Data:     Lab Results   Component Value Date     04/11/2017    K 4.4 04/11/2017     04/11/2017    CO2 27 04/11/2017    BUN 18 04/11/2017    CREATININE 1.13 04/11/2017    GLUCOSE 92 04/11/2017    PROT 6.7 04/11/2017    LABALBU 4.2 04/11/2017    BILITOT 1.45 04/11/2017    ALKPHOS 74 04/11/2017    AST 16 04/11/2017    ALT <5 04/11/2017     Lab Results   Component Value Date    WBC 7.2 04/11/2017    RBC 5.32 04/11/2017    HGB 15.4 04/11/2017    HCT 45.7 04/11/2017    MCV 85.8 04/11/2017    MCH 29.0 04/11/2017    MCHC 33.8 04/11/2017    RDW 13.5 04/11/2017     04/11/2017    MPV NOT REPORTED 04/11/2017     Lab Results   Component Value Date    TSH 1.94 04/11/2017     Lab Results   Component Value Date    LABA1C 5.2 04/11/2017          Assessment & Plan        Diagnosis Orders   1. Strain of lumbar paraspinous muscle, initial encounter       Will start patient on tizanidine at bedtime and meloxicam daily. Pt given some exercises to do at home. Pt educated about proper back safety and lifting techniques. Patient verbalizes understanding and agreement with plan. All questions answered. If symptoms do not resolve or worsen, return to office. Completed Refills   Requested Prescriptions      No prescriptions requested or ordered in this encounter     No follow-ups on file. No orders of the defined types were placed in this encounter. No orders of the defined types were placed in this encounter. Patient Instructions     SURVEY:    You may be receiving a survey from [employer] regarding your visit today. Please complete the survey to enable us to provide the highest quality of care to you and your family. If you cannot score us a very good on any question, please call the office to discuss how we could of made your experience a very good one. Thank you.         Electronically signed by GARO Kurtz CNP on 9/29/2020 at 9:06 AM           Completed Refills      Requested Prescriptions      No prescriptions requested or ordered in this encounter         Remi Saint received counseling on the following healthy behaviors: nutrition, exercise and medication adherence  Reviewed prior labs and health maintenance. Continue current medications, diet and exercise. Discussed use, benefit, and side effects of prescribed medications. Barriers to medication compliance addressed. Patient given educational materials - see patient instructions. All patient questions answered. Patient voiced understanding.

## 2021-03-11 ENCOUNTER — TELEPHONE (OUTPATIENT)
Dept: FAMILY MEDICINE CLINIC | Age: 36
End: 2021-03-11

## 2021-03-11 ENCOUNTER — OFFICE VISIT (OUTPATIENT)
Dept: FAMILY MEDICINE CLINIC | Age: 36
End: 2021-03-11
Payer: COMMERCIAL

## 2021-03-11 ENCOUNTER — HOSPITAL ENCOUNTER (OUTPATIENT)
Dept: PREADMISSION TESTING | Age: 36
Setting detail: SPECIMEN
Discharge: HOME | End: 2021-03-11
Payer: COMMERCIAL

## 2021-03-11 VITALS
SYSTOLIC BLOOD PRESSURE: 110 MMHG | DIASTOLIC BLOOD PRESSURE: 78 MMHG | WEIGHT: 242 LBS | OXYGEN SATURATION: 97 % | HEART RATE: 80 BPM | TEMPERATURE: 98.7 F | BODY MASS INDEX: 34.72 KG/M2

## 2021-03-11 DIAGNOSIS — R52 BODY ACHES: ICD-10-CM

## 2021-03-11 DIAGNOSIS — Z20.822 SUSPECTED COVID-19 VIRUS INFECTION: Primary | ICD-10-CM

## 2021-03-11 LAB
BILIRUBIN, POC: NORMAL
BLOOD URINE, POC: NORMAL
CLARITY, POC: CLEAR
COLOR, POC: YELLOW
GLUCOSE URINE, POC: NORMAL
KETONES, POC: NORMAL
LEUKOCYTE EST, POC: NORMAL
NITRITE, POC: NORMAL
PH, POC: 6
PROTEIN, POC: NORMAL
SARS-COV-2, RAPID: NOT DETECTED
SPECIFIC GRAVITY, POC: 1.02
SPECIMEN DESCRIPTION: NORMAL
UROBILINOGEN, POC: 0.2

## 2021-03-11 PROCEDURE — 1036F TOBACCO NON-USER: CPT | Performed by: NURSE PRACTITIONER

## 2021-03-11 PROCEDURE — G8427 DOCREV CUR MEDS BY ELIG CLIN: HCPCS | Performed by: NURSE PRACTITIONER

## 2021-03-11 PROCEDURE — C9803 HOPD COVID-19 SPEC COLLECT: HCPCS

## 2021-03-11 PROCEDURE — 99213 OFFICE O/P EST LOW 20 MIN: CPT | Performed by: NURSE PRACTITIONER

## 2021-03-11 PROCEDURE — G8417 CALC BMI ABV UP PARAM F/U: HCPCS | Performed by: NURSE PRACTITIONER

## 2021-03-11 PROCEDURE — G8484 FLU IMMUNIZE NO ADMIN: HCPCS | Performed by: NURSE PRACTITIONER

## 2021-03-11 PROCEDURE — U0002 COVID-19 LAB TEST NON-CDC: HCPCS

## 2021-03-11 PROCEDURE — 81002 URINALYSIS NONAUTO W/O SCOPE: CPT | Performed by: NURSE PRACTITIONER

## 2021-03-11 ASSESSMENT — PATIENT HEALTH QUESTIONNAIRE - PHQ9
SUM OF ALL RESPONSES TO PHQ QUESTIONS 1-9: 0
SUM OF ALL RESPONSES TO PHQ QUESTIONS 1-9: 0
SUM OF ALL RESPONSES TO PHQ9 QUESTIONS 1 & 2: 0

## 2021-03-11 ASSESSMENT — ENCOUNTER SYMPTOMS
BACK PAIN: 1
BOWEL INCONTINENCE: 0

## 2021-03-11 NOTE — TELEPHONE ENCOUNTER
Adis Mayo said he got his test results back and they were negative. He is wanting to know what his next step is. Should he treat this like a cold or does he need something called in for him. He is just concerned with being sick and being around his mom, wife, and kids. Please let Adis Maoy know. Health Maintenance   Topic Date Due    Varicella vaccine (1 of 2 - 2-dose childhood series) Never done    HIV screen  Never done    DTaP/Tdap/Td vaccine (1 - Tdap) Never done    Flu vaccine (1) Never done    Hepatitis C screen  Completed    Hepatitis A vaccine  Aged Out    Hepatitis B vaccine  Aged Out    Hib vaccine  Aged Out    Meningococcal (ACWY) vaccine  Aged Out    Pneumococcal 0-64 years Vaccine  Aged Out             (applicable per patient's age: Cancer Screenings, Depression Screening, Fall Risk Screening, Immunizations)    Hemoglobin A1C (%)   Date Value   04/11/2017 5.2     AST (U/L)   Date Value   04/11/2017 16     ALT (U/L)   Date Value   04/11/2017 <5 (L)     BUN (mg/dL)   Date Value   04/11/2017 18      (goal A1C is < 7)   (goal LDL is <100) need 30-50% reduction from baseline     BP Readings from Last 3 Encounters:   03/11/21 110/78   09/29/20 124/79   06/30/20 124/82    (goal /80)      All Future Testing planned in [facility]:  Lab Frequency Next Occurrence   COVID-19 Once 03/11/2021       Next Visit Date:  No future appointments.          Patient Active Problem List:     Paresthesia of left leg

## 2021-03-11 NOTE — TELEPHONE ENCOUNTER
----- Message from GARO Dickerson CNP sent at 3/11/2021  1:16 PM EST -----  Please let pt know that his covid test was neg. There is a possibility that it is too early in the process to be detected by the rapid test. Continue to monitor symptoms. Continue to treat symptoms with OTC medications.      Sudafed 12HR 120mg twice daily (nasal decongestant)  Flonase 1 spray each nostril twice daily (nasal steroid)  Zyrtec 10mg Daily OR Claritin 10mg Daily (antihistamine)  Ibuprofen 3 times a day (antiinflammatory)   Warm tea with 1tbsp honey (soothes the throat)  Increase water intake  Rest

## 2021-03-11 NOTE — PROGRESS NOTES
HPI Notes    Name: El Sanchez  : 1985         Chief Complaint:     Chief Complaint   Patient presents with    Back Pain     Patient complains of lower back pain started 2 days ago. Dull pain, tender to the touch.  Fever     Fever up to 100.2 a couple days ago. Some sinus pressure, nasal congestion.  Concern For COVID-19       History of Present Illness:        Back Pain  This is a new problem. The current episode started in the past 7 days. The problem occurs daily. The pain is present in the lumbar spine. The quality of the pain is described as aching. The pain is mild. The symptoms are aggravated by position. Associated symptoms include a fever. Pertinent negatives include no bladder incontinence, bowel incontinence, leg pain, numbness, perianal numbness or tingling. Fever   This is a new problem. The current episode started in the past 7 days. Associated symptoms include muscle aches. Past Medical History:     No past medical history on file. Reviewed all health maintenance requirements and ordered appropriate tests  Health Maintenance Due   Topic Date Due    Varicella vaccine (1 of 2 - 2-dose childhood series) Never done    HIV screen  Never done    DTaP/Tdap/Td vaccine (1 - Tdap) Never done    Flu vaccine (1) Never done       Past Surgical History:     Past Surgical History:   Procedure Laterality Date    WRIST SURGERY Right         Medications:       Prior to Admission medications    Medication Sig Start Date End Date Taking? Authorizing Provider   Multiple Vitamins-Minerals (MULTIVITAMIN PO) Take by mouth   Yes Historical Provider, MD        Allergies:       Patient has no known allergies. Social History:     Tobacco:    reports that he has never smoked. He has never used smokeless tobacco.  Alcohol:      reports current alcohol use. Drug Use:  reports no history of drug use. Family History:      No family history on file.     Review of Systems:         Review of Systems   Constitutional: Positive for fever. Gastrointestinal: Negative for bowel incontinence. Genitourinary: Negative for bladder incontinence. Musculoskeletal: Positive for back pain. Neurological: Negative for tingling and numbness. Physical Exam:     Vitals:  /78   Pulse 80   Temp 98.7 °F (37.1 °C) (Oral)   Wt 242 lb (109.8 kg)   SpO2 97%   BMI 34.72 kg/m²       Physical Exam  Vitals signs and nursing note reviewed. Constitutional:       Appearance: He is well-developed. HENT:      Right Ear: Tympanic membrane normal.      Left Ear: Tympanic membrane normal.      Nose: Mucosal edema present. Mouth/Throat:      Pharynx: Posterior oropharyngeal erythema present. Cardiovascular:      Rate and Rhythm: Normal rate and regular rhythm. Heart sounds: Normal heart sounds. Pulmonary:      Effort: Pulmonary effort is normal. No respiratory distress. Breath sounds: Normal breath sounds. Musculoskeletal:      Thoracic back: He exhibits tenderness. Neurological:      Mental Status: He is alert. Psychiatric:         Behavior: Behavior is cooperative. Data:     Lab Results   Component Value Date     04/11/2017    K 4.4 04/11/2017     04/11/2017    CO2 27 04/11/2017    BUN 18 04/11/2017    CREATININE 1.13 04/11/2017    GLUCOSE 92 04/11/2017    PROT 6.7 04/11/2017    LABALBU 4.2 04/11/2017    BILITOT 1.45 04/11/2017    ALKPHOS 74 04/11/2017    AST 16 04/11/2017    ALT <5 04/11/2017     Lab Results   Component Value Date    WBC 7.2 04/11/2017    RBC 5.32 04/11/2017    HGB 15.4 04/11/2017    HCT 45.7 04/11/2017    MCV 85.8 04/11/2017    MCH 29.0 04/11/2017    MCHC 33.8 04/11/2017    RDW 13.5 04/11/2017     04/11/2017    MPV NOT REPORTED 04/11/2017     Lab Results   Component Value Date    TSH 1.94 04/11/2017     Lab Results   Component Value Date    LABA1C 5.2 04/11/2017          Assessment & Plan        Diagnosis Orders   1.  Suspected COVID-19 virus infection  POCT Urinalysis no Micro    COVID-19   2. Body aches       Will send for covid testing. Pt educated about quarantining until results are back. May treat symptoms. Patient verbalizes understanding and agreement with plan. All questions answered. If symptoms do not resolve or worsen, return to office. Completed Refills   Requested Prescriptions      No prescriptions requested or ordered in this encounter     No follow-ups on file. No orders of the defined types were placed in this encounter. Orders Placed This Encounter   Procedures    COVID-19     Standing Status:   Future     Standing Expiration Date:   3/11/2022     Scheduling Instructions:      1) Due to current limited availability of the COVID-19 test, tests will be prioritized based on responses to questions above. Testing may be delayed due to volume. 2) Print and instruct patient to adhere to CDC home isolation program. (Link Above)              3) Set up or refer patient for a monitoring program.              4) Have patient sign up for and leverage MyChart (if not previously done). Order Specific Question:   Is this test for diagnosis or screening? Answer:   Diagnosis of ill patient     Order Specific Question:   Symptomatic for COVID-19 as defined by CDC? Answer:   Yes     Order Specific Question:   Date of Symptom Onset     Answer:   3/9/2021     Order Specific Question:   Hospitalized for COVID-19? Answer:   No     Order Specific Question:   Admitted to ICU for COVID-19? Answer:   No     Order Specific Question:   Employed in healthcare setting? Answer:   No     Order Specific Question:   Resident in a congregate (group) care setting? Answer:   No     Order Specific Question:   Pregnant: Answer:   No     Order Specific Question:   Previously tested for COVID-19?      Answer:   No    POCT Urinalysis no Micro         Patient Instructions   SURVEY:    You may be receiving a survey from [employer] regarding your visit today. Please complete the survey to enable us to provide the highest quality of care to you and your family. If you cannot score us a very good (5 Stars) on any question, please call the office to discuss how we could have made your experience a very good one. Thank you. Clinical Care Team: DI Terrell LPN    Clerical Team: Memo Gomes        Electronically signed by GARO Terrell CNP on 3/11/2021 at 9:52 AM           Completed Refills      Requested Prescriptions      No prescriptions requested or ordered in this encounter         Mayte Nash received counseling on the following healthy behaviors: medication adherence  Reviewed prior labs and health maintenance. Continue current medications, diet and exercise. Discussed use, benefit, and side effects of prescribed medications. Barriers to medication compliance addressed. Patient given educational materials - see patient instructions. All patient questions answered. Patient voiced understanding.

## 2021-09-02 ENCOUNTER — OFFICE VISIT (OUTPATIENT)
Dept: FAMILY MEDICINE CLINIC | Age: 36
End: 2021-09-02
Payer: COMMERCIAL

## 2021-09-02 VITALS
SYSTOLIC BLOOD PRESSURE: 114 MMHG | WEIGHT: 242 LBS | DIASTOLIC BLOOD PRESSURE: 80 MMHG | BODY MASS INDEX: 34.72 KG/M2 | TEMPERATURE: 98.1 F | HEART RATE: 64 BPM | OXYGEN SATURATION: 99 %

## 2021-09-02 DIAGNOSIS — M54.16 LUMBAR RADICULOPATHY: Primary | ICD-10-CM

## 2021-09-02 PROCEDURE — G8427 DOCREV CUR MEDS BY ELIG CLIN: HCPCS | Performed by: NURSE PRACTITIONER

## 2021-09-02 PROCEDURE — 1036F TOBACCO NON-USER: CPT | Performed by: NURSE PRACTITIONER

## 2021-09-02 PROCEDURE — G8417 CALC BMI ABV UP PARAM F/U: HCPCS | Performed by: NURSE PRACTITIONER

## 2021-09-02 PROCEDURE — 99213 OFFICE O/P EST LOW 20 MIN: CPT | Performed by: NURSE PRACTITIONER

## 2021-09-02 SDOH — ECONOMIC STABILITY: FOOD INSECURITY: WITHIN THE PAST 12 MONTHS, YOU WORRIED THAT YOUR FOOD WOULD RUN OUT BEFORE YOU GOT MONEY TO BUY MORE.: NEVER TRUE

## 2021-09-02 SDOH — ECONOMIC STABILITY: FOOD INSECURITY: WITHIN THE PAST 12 MONTHS, THE FOOD YOU BOUGHT JUST DIDN'T LAST AND YOU DIDN'T HAVE MONEY TO GET MORE.: NEVER TRUE

## 2021-09-02 ASSESSMENT — SOCIAL DETERMINANTS OF HEALTH (SDOH): HOW HARD IS IT FOR YOU TO PAY FOR THE VERY BASICS LIKE FOOD, HOUSING, MEDICAL CARE, AND HEATING?: NOT HARD AT ALL

## 2021-09-02 ASSESSMENT — ENCOUNTER SYMPTOMS
VOMITING: 0
NAUSEA: 0
DIARRHEA: 0
SHORTNESS OF BREATH: 0
BOWEL INCONTINENCE: 0
BACK PAIN: 1
COUGH: 0

## 2021-09-02 NOTE — PATIENT INSTRUCTIONS
SURVEY:    You may be receiving a survey from [employer] regarding your visit today. Please complete the survey to enable us to provide the highest quality of care to you and your family. If you cannot score us a very good (5 Stars) on any question, please call the office to discuss how we could have made your experience a very good one. Thank you.     Clinical Care Team: GARO Soria-CRISTINE Boland LPN    Clerical Team: Matthieu Romero

## 2021-09-02 NOTE — PROGRESS NOTES
HPI Notes    Name: Gordon Medellin  : 1985         Chief Complaint:     Chief Complaint   Patient presents with    Back Pain     Patient here today for chronic back pain, needs renewal on [place] papers. History of Present Illness:        Back Pain  This is a chronic problem. The current episode started more than 1 year ago. The problem has been waxing and waning since onset. The pain is present in the lumbar spine. The quality of the pain is described as aching. The pain is mild. Pertinent negatives include no bladder incontinence, bowel incontinence, chest pain, fever, headaches, paresis or paresthesias. Risk factors include obesity. He has tried NSAIDs and home exercises for the symptoms. The treatment provided mild relief. Pt has been doing some exercise to keep his back loose. Misses about 1 day a month due to back pain. Past Medical History:     No past medical history on file. Reviewed all health maintenance requirements and ordered appropriate tests  Health Maintenance Due   Topic Date Due    Varicella vaccine (1 of 2 - 2-dose childhood series) Never done    COVID-19 Vaccine (1) Never done    HIV screen  Never done    DTaP/Tdap/Td vaccine (1 - Tdap) Never done    Flu vaccine (1) Never done       Past Surgical History:     Past Surgical History:   Procedure Laterality Date    WRIST SURGERY Right         Medications:       Prior to Admission medications    Medication Sig Start Date End Date Taking? Authorizing Provider   Multiple Vitamins-Minerals (MULTIVITAMIN PO) Take by mouth   Yes Historical Provider, MD        Allergies:       Patient has no known allergies. Social History:     Tobacco:    reports that he has never smoked. He has never used smokeless tobacco.  Alcohol:      reports current alcohol use. Drug Use:  reports no history of drug use. Family History:      No family history on file.     Review of Systems:         Review of Systems   Constitutional: Negative for chills and fever. Respiratory: Negative for cough and shortness of breath. Cardiovascular: Negative for chest pain and palpitations. Gastrointestinal: Negative for bowel incontinence, diarrhea, nausea and vomiting. Genitourinary: Negative for bladder incontinence. Musculoskeletal: Positive for back pain. Neurological: Negative for dizziness, seizures, headaches and paresthesias. Physical Exam:     Vitals:  /80   Pulse 64   Temp 98.1 °F (36.7 °C) (Oral)   Wt 242 lb (109.8 kg)   SpO2 99%   BMI 34.72 kg/m²       Physical Exam  Vitals and nursing note reviewed. Constitutional:       Appearance: He is well-developed. Cardiovascular:      Rate and Rhythm: Normal rate and regular rhythm. Heart sounds: S1 normal and S2 normal.   Pulmonary:      Effort: Pulmonary effort is normal. No respiratory distress. Breath sounds: Normal breath sounds. Abdominal:      General: Bowel sounds are normal.      Palpations: Abdomen is soft. Tenderness: There is no abdominal tenderness. Musculoskeletal:      Lumbar back: Tenderness and bony tenderness present. Normal range of motion. Comments: NO tenderness to paraspinal muscles  NO increased pain with forward bend  NO increased pain with backward bend  NO increase in pain with side to side bend  NO increased pain with truncal twisting  NO increased pain with straight leg raised test bilat  Foot and ankle strength 5/5 bilat  Knee raise strength 5/5 bilat     Skin:     General: Skin is warm and dry. Psychiatric:         Behavior: Behavior is cooperative.                Data:     Lab Results   Component Value Date     04/11/2017    K 4.4 04/11/2017     04/11/2017    CO2 27 04/11/2017    BUN 18 04/11/2017    CREATININE 1.13 04/11/2017    GLUCOSE 92 04/11/2017    PROT 6.7 04/11/2017    LABALBU 4.2 04/11/2017    BILITOT 1.45 04/11/2017    ALKPHOS 74 04/11/2017    AST 16 04/11/2017    ALT <5 04/11/2017     Lab Results Component Value Date    WBC 7.2 04/11/2017    RBC 5.32 04/11/2017    HGB 15.4 04/11/2017    HCT 45.7 04/11/2017    MCV 85.8 04/11/2017    MCH 29.0 04/11/2017    MCHC 33.8 04/11/2017    RDW 13.5 04/11/2017     04/11/2017    MPV NOT REPORTED 04/11/2017     Lab Results   Component Value Date    TSH 1.94 04/11/2017     Lab Results   Component Value Date    LABA1C 5.2 04/11/2017          Assessment & Plan        Diagnosis Orders   1. Lumbar radiculopathy       Continue as needed use of NSAIDs. Continue light exercise and stretching. Will renew FMLA paperwork. Patient verbalizes understanding and agreement with plan. All questions answered. If symptoms do not resolve or worsen, return to office. Completed Refills   Requested Prescriptions      No prescriptions requested or ordered in this encounter     No follow-ups on file. No orders of the defined types were placed in this encounter. No orders of the defined types were placed in this encounter. Patient Instructions   SURVEY:    You may be receiving a survey from [employer] regarding your visit today. Please complete the survey to enable us to provide the highest quality of care to you and your family. If you cannot score us a very good (5 Stars) on any question, please call the office to discuss how we could have made your experience a very good one. Thank you.     Clinical Care Team: DI Prince LPN    Clerical Team: [address] [place] [address]        Electronically signed by GARO Prince CNP on 9/2/2021 at 5:31 PM           Completed Refills      Requested Prescriptions      No prescriptions requested or ordered in this encounter         Nicole Pizano received counseling on the following healthy behaviors: nutrition, exercise and medication adherence  Reviewed prior labs and health maintenance. Continue current medications, diet and exercise. Discussed use, benefit, and side effects of prescribed medications. Barriers to medication compliance addressed. Patient given educational materials - see patient instructions. All patient questions answered. Patient voiced understanding.

## 2022-03-16 ENCOUNTER — OFFICE VISIT (OUTPATIENT)
Dept: FAMILY MEDICINE CLINIC | Age: 37
End: 2022-03-16
Payer: COMMERCIAL

## 2022-03-16 VITALS
HEIGHT: 70 IN | BODY MASS INDEX: 35.5 KG/M2 | HEART RATE: 68 BPM | TEMPERATURE: 98 F | OXYGEN SATURATION: 98 % | SYSTOLIC BLOOD PRESSURE: 122 MMHG | WEIGHT: 248 LBS | DIASTOLIC BLOOD PRESSURE: 80 MMHG

## 2022-03-16 DIAGNOSIS — Z13.1 ENCOUNTER FOR SCREENING EXAMINATION FOR IMPAIRED GLUCOSE REGULATION AND DIABETES MELLITUS: ICD-10-CM

## 2022-03-16 DIAGNOSIS — Z00.00 ANNUAL PHYSICAL EXAM: Primary | ICD-10-CM

## 2022-03-16 DIAGNOSIS — Z13.220 SCREENING FOR HYPERLIPIDEMIA: ICD-10-CM

## 2022-03-16 DIAGNOSIS — Z13.0 SCREENING FOR DEFICIENCY ANEMIA: ICD-10-CM

## 2022-03-16 DIAGNOSIS — Z23 NEED FOR TDAP VACCINATION: ICD-10-CM

## 2022-03-16 PROCEDURE — 90471 IMMUNIZATION ADMIN: CPT | Performed by: NURSE PRACTITIONER

## 2022-03-16 PROCEDURE — 90715 TDAP VACCINE 7 YRS/> IM: CPT | Performed by: NURSE PRACTITIONER

## 2022-03-16 PROCEDURE — G8484 FLU IMMUNIZE NO ADMIN: HCPCS | Performed by: NURSE PRACTITIONER

## 2022-03-16 PROCEDURE — 99395 PREV VISIT EST AGE 18-39: CPT | Performed by: NURSE PRACTITIONER

## 2022-03-16 ASSESSMENT — ENCOUNTER SYMPTOMS
VOMITING: 0
SHORTNESS OF BREATH: 0
ABDOMINAL PAIN: 0
SORE THROAT: 0
CONSTIPATION: 0
BLOOD IN STOOL: 0
NAUSEA: 0
COUGH: 0
BACK PAIN: 0
DIARRHEA: 0

## 2022-03-16 ASSESSMENT — PATIENT HEALTH QUESTIONNAIRE - PHQ9
SUM OF ALL RESPONSES TO PHQ9 QUESTIONS 1 & 2: 0
2. FEELING DOWN, DEPRESSED OR HOPELESS: 0
SUM OF ALL RESPONSES TO PHQ QUESTIONS 1-9: 0
SUM OF ALL RESPONSES TO PHQ QUESTIONS 1-9: 0
1. LITTLE INTEREST OR PLEASURE IN DOING THINGS: 0
SUM OF ALL RESPONSES TO PHQ QUESTIONS 1-9: 0
SUM OF ALL RESPONSES TO PHQ QUESTIONS 1-9: 0

## 2022-03-16 NOTE — PROGRESS NOTES
HPI Notes    Name: Karla Flores  : 1985         Chief Complaint:     Chief Complaint   Patient presents with    Annual Exam     Patient here today for annual physical.        History of Present Illness:        HPI  Pt is a 42 yo male who presents for an annual physical. Pt has no concerns about his health. Has a hx of low back pain with some bulging discs and spinal stenosis, but it is not bothering him at this time. Past Medical History:     No past medical history on file. Reviewed all health maintenance requirements and ordered appropriate tests  Health Maintenance Due   Topic Date Due    Varicella vaccine (1 of 2 - 2-dose childhood series) Never done    COVID-19 Vaccine (1) Never done    HIV screen  Never done    DTaP/Tdap/Td vaccine (1 - Tdap) Never done    Diabetes screen  2018    Flu vaccine (1) Never done       Past Surgical History:     Past Surgical History:   Procedure Laterality Date    WRIST SURGERY Right         Medications:       Prior to Admission medications    Medication Sig Start Date End Date Taking? Authorizing Provider   Multiple Vitamins-Minerals (MULTIVITAMIN PO) Take by mouth   Yes Historical Provider, MD        Allergies:       Patient has no known allergies. Social History:     Tobacco:    reports that he has never smoked. He has never used smokeless tobacco.  Alcohol:      reports current alcohol use. Drug Use:  reports no history of drug use. Family History:      No family history on file. Review of Systems:         Review of Systems   Constitutional: Negative for chills and fever. HENT: Negative for sore throat. Respiratory: Negative for cough and shortness of breath. Cardiovascular: Negative for chest pain, palpitations and leg swelling. Gastrointestinal: Negative for abdominal pain, blood in stool, constipation, diarrhea, nausea and vomiting. Genitourinary: Negative for dysuria, frequency and hematuria.    Musculoskeletal: Negative for back pain and neck pain. Skin: Negative for rash. Neurological: Negative for dizziness, tremors, seizures, weakness and headaches. Hematological: Does not bruise/bleed easily. Psychiatric/Behavioral: Negative for suicidal ideas. The patient is not nervous/anxious. Physical Exam:     Vitals:  /80   Pulse 68   Temp 98 °F (36.7 °C) (Oral)   Ht 5' 10\" (1.778 m)   Wt 248 lb (112.5 kg)   SpO2 98%   BMI 35.58 kg/m²       Physical Exam  Vitals and nursing note reviewed. Constitutional:       Appearance: He is well-developed. HENT:      Head: Normocephalic. Right Ear: Hearing, tympanic membrane and external ear normal.      Left Ear: Tympanic membrane and external ear normal.      Nose: Nose normal.      Mouth/Throat:      Pharynx: Uvula midline. No oropharyngeal exudate. Eyes:      Conjunctiva/sclera: Conjunctivae normal.      Pupils: Pupils are equal, round, and reactive to light. Neck:      Vascular: No carotid bruit. Cardiovascular:      Rate and Rhythm: Normal rate and regular rhythm. Pulses:           Dorsalis pedis pulses are 2+ on the right side and 2+ on the left side. Heart sounds: S1 normal and S2 normal.   Pulmonary:      Effort: Pulmonary effort is normal. No respiratory distress. Breath sounds: Normal breath sounds. Abdominal:      Palpations: Abdomen is soft. Tenderness: There is no abdominal tenderness. Musculoskeletal:         General: Normal range of motion. Cervical back: Normal range of motion and neck supple. Skin:     General: Skin is warm and dry. Findings: No rash. Neurological:      Mental Status: He is alert and oriented to person, place, and time. GCS: GCS eye subscore is 4. GCS verbal subscore is 5. GCS motor subscore is 6. Deep Tendon Reflexes: Reflexes are normal and symmetric. Psychiatric:         Speech: Speech normal.         Behavior: Behavior normal. Behavior is cooperative. Thought Content: Thought content normal.         Judgment: Judgment normal.               Data:     Lab Results   Component Value Date     04/11/2017    K 4.4 04/11/2017     04/11/2017    CO2 27 04/11/2017    BUN 18 04/11/2017    CREATININE 1.13 04/11/2017    GLUCOSE 92 04/11/2017    PROT 6.7 04/11/2017    LABALBU 4.2 04/11/2017    BILITOT 1.45 04/11/2017    ALKPHOS 74 04/11/2017    AST 16 04/11/2017    ALT <5 04/11/2017     Lab Results   Component Value Date    WBC 7.2 04/11/2017    RBC 5.32 04/11/2017    HGB 15.4 04/11/2017    HCT 45.7 04/11/2017    MCV 85.8 04/11/2017    MCH 29.0 04/11/2017    MCHC 33.8 04/11/2017    RDW 13.5 04/11/2017     04/11/2017    MPV NOT REPORTED 04/11/2017     Lab Results   Component Value Date    TSH 1.94 04/11/2017     Lab Results   Component Value Date    LABA1C 5.2 04/11/2017          Assessment & Plan        Diagnosis Orders   1. Annual physical exam  --Routine adult health physical.  Patient has no complaints at this time. No abnormalities or deficiencies. Patient educated about adequate water intake  Patient educated about exercise and fitness  Patient educated about nutrition    Health maintenance requirements reviewed with patient       2. Screening for hyperlipidemia     3. Encounter for screening examination for impaired glucose regulation and diabetes mellitus     4. Screening for deficiency anemia                     Completed Refills   Requested Prescriptions      No prescriptions requested or ordered in this encounter     No follow-ups on file. No orders of the defined types were placed in this encounter. No orders of the defined types were placed in this encounter. There are no Patient Instructions on file for this visit.     Electronically signed by GARO Calle CNP on 3/16/2022 at 9:20 AM           Completed Refills      Requested Prescriptions      No prescriptions requested or ordered in this encounter         Scott Blackman received counseling on the following healthy behaviors: nutrition, exercise and medication adherence  Reviewed prior labs and health maintenance. Continue current medications, diet and exercise. Discussed use, benefit, and side effects of prescribed medications. Barriers to medication compliance addressed. Patient given educational materials - see patient instructions. All patient questions answered. Patient voiced understanding.

## 2022-03-16 NOTE — PROGRESS NOTES
After obtaining consent, and per orders of DI Salinas, injection of tdap given in Left arm by Jc Rebolledo LPN. Patient instructed to remain in clinic for 20 minutes afterwards, and to report any adverse reaction to me immediately.

## 2022-03-18 ENCOUNTER — HOSPITAL ENCOUNTER (OUTPATIENT)
Age: 37
Discharge: HOME | End: 2022-03-18
Payer: COMMERCIAL

## 2022-03-18 DIAGNOSIS — Z13.220 SCREENING FOR HYPERLIPIDEMIA: ICD-10-CM

## 2022-03-18 DIAGNOSIS — Z00.00 ANNUAL PHYSICAL EXAM: ICD-10-CM

## 2022-03-18 DIAGNOSIS — Z13.1 ENCOUNTER FOR SCREENING EXAMINATION FOR IMPAIRED GLUCOSE REGULATION AND DIABETES MELLITUS: ICD-10-CM

## 2022-03-18 DIAGNOSIS — Z13.0 SCREENING FOR DEFICIENCY ANEMIA: ICD-10-CM

## 2022-03-18 LAB
ABSOLUTE EOS #: 0.2 K/UL (ref 0–0.4)
ABSOLUTE LYMPH #: 1.6 K/UL (ref 1–4.8)
ABSOLUTE MONO #: 0.5 K/UL (ref 0–1)
ALBUMIN SERPL-MCNC: 4.5 G/DL (ref 3.5–5.2)
ALP BLD-CCNC: 92 U/L (ref 40–129)
ALT SERPL-CCNC: 10 U/L (ref 5–41)
ANION GAP SERPL CALCULATED.3IONS-SCNC: 9 MMOL/L (ref 9–17)
AST SERPL-CCNC: 20 U/L
BASOPHILS # BLD: 0 % (ref 0–2)
BASOPHILS ABSOLUTE: 0 K/UL (ref 0–0.2)
BILIRUB SERPL-MCNC: 1.56 MG/DL (ref 0.3–1.2)
BUN BLDV-MCNC: 22 MG/DL (ref 6–20)
BUN/CREAT BLD: 23 (ref 9–20)
CALCIUM SERPL-MCNC: 9.6 MG/DL (ref 8.6–10.4)
CHLORIDE BLD-SCNC: 100 MMOL/L (ref 98–107)
CHOLESTEROL/HDL RATIO: 3.5
CHOLESTEROL: 205 MG/DL
CO2: 28 MMOL/L (ref 20–31)
CREAT SERPL-MCNC: 0.95 MG/DL (ref 0.7–1.2)
DIFFERENTIAL TYPE: YES
EOSINOPHILS RELATIVE PERCENT: 4 % (ref 0–5)
GFR AFRICAN AMERICAN: >60 ML/MIN
GFR NON-AFRICAN AMERICAN: >60 ML/MIN
GFR SERPL CREATININE-BSD FRML MDRD: ABNORMAL ML/MIN/{1.73_M2}
GLUCOSE BLD-MCNC: 101 MG/DL (ref 70–99)
HCT VFR BLD CALC: 46 % (ref 41–53)
HDLC SERPL-MCNC: 58 MG/DL
HEMOGLOBIN: 15.8 G/DL (ref 13.5–17.5)
LDL CHOLESTEROL: 130 MG/DL (ref 0–130)
LYMPHOCYTES # BLD: 26 % (ref 13–44)
MCH RBC QN AUTO: 29 PG (ref 26–34)
MCHC RBC AUTO-ENTMCNC: 34.3 G/DL (ref 31–37)
MCV RBC AUTO: 84.7 FL (ref 80–100)
MONOCYTES # BLD: 8 % (ref 5–9)
PATIENT FASTING?: YES
PDW BLD-RTO: 13.8 % (ref 12.1–15.2)
PLATELET # BLD: 222 K/UL (ref 140–450)
POTASSIUM SERPL-SCNC: 4 MMOL/L (ref 3.7–5.3)
RBC # BLD: 5.44 M/UL (ref 4.5–5.9)
SEG NEUTROPHILS: 62 % (ref 39–75)
SEGMENTED NEUTROPHILS ABSOLUTE COUNT: 3.8 K/UL (ref 2.1–6.5)
SODIUM BLD-SCNC: 137 MMOL/L (ref 135–144)
TOTAL PROTEIN: 7.2 G/DL (ref 6.4–8.3)
TRIGL SERPL-MCNC: 85 MG/DL
WBC # BLD: 6.2 K/UL (ref 3.5–11)

## 2022-03-18 PROCEDURE — 36415 COLL VENOUS BLD VENIPUNCTURE: CPT

## 2022-03-18 PROCEDURE — 83036 HEMOGLOBIN GLYCOSYLATED A1C: CPT

## 2022-03-18 PROCEDURE — 85025 COMPLETE CBC W/AUTO DIFF WBC: CPT

## 2022-03-18 PROCEDURE — 80061 LIPID PANEL: CPT

## 2022-03-18 PROCEDURE — 80053 COMPREHEN METABOLIC PANEL: CPT

## 2022-03-19 LAB
ESTIMATED AVERAGE GLUCOSE: 108 MG/DL
HBA1C MFR BLD: 5.4 % (ref 4–6)

## 2022-05-10 ENCOUNTER — OFFICE VISIT (OUTPATIENT)
Dept: FAMILY MEDICINE CLINIC | Age: 37
End: 2022-05-10
Payer: COMMERCIAL

## 2022-05-10 ENCOUNTER — HOSPITAL ENCOUNTER (OUTPATIENT)
Dept: GENERAL RADIOLOGY | Age: 37
Discharge: HOME | End: 2022-05-12
Payer: COMMERCIAL

## 2022-05-10 ENCOUNTER — HOSPITAL ENCOUNTER (OUTPATIENT)
Age: 37
Discharge: HOME | End: 2022-05-12
Payer: COMMERCIAL

## 2022-05-10 VITALS
HEART RATE: 78 BPM | WEIGHT: 242 LBS | DIASTOLIC BLOOD PRESSURE: 84 MMHG | BODY MASS INDEX: 34.72 KG/M2 | OXYGEN SATURATION: 97 % | SYSTOLIC BLOOD PRESSURE: 130 MMHG

## 2022-05-10 DIAGNOSIS — M25.512 ACUTE PAIN OF LEFT SHOULDER: ICD-10-CM

## 2022-05-10 DIAGNOSIS — M25.512 ACUTE PAIN OF LEFT SHOULDER: Primary | ICD-10-CM

## 2022-05-10 PROCEDURE — 73030 X-RAY EXAM OF SHOULDER: CPT

## 2022-05-10 PROCEDURE — G8417 CALC BMI ABV UP PARAM F/U: HCPCS | Performed by: FAMILY MEDICINE

## 2022-05-10 PROCEDURE — G8427 DOCREV CUR MEDS BY ELIG CLIN: HCPCS | Performed by: FAMILY MEDICINE

## 2022-05-10 PROCEDURE — 99213 OFFICE O/P EST LOW 20 MIN: CPT | Performed by: FAMILY MEDICINE

## 2022-05-10 PROCEDURE — 1036F TOBACCO NON-USER: CPT | Performed by: FAMILY MEDICINE

## 2022-05-10 NOTE — PROGRESS NOTES
HPI Notes    Name: Gordon Medellin  : 1985        Chief Complaint:     Chief Complaint   Patient presents with    Shoulder Pain     Patient complains of left shoulder pain, felt a lump area on left shoulder in the last 2 days. History of Present Illness:     Gordon Medellin is a 43 y.o.  male who presents with Shoulder Pain (Patient complains of left shoulder pain, felt a lump area on left shoulder in the last 2 days. )      Shoulder Pain   The pain is present in the left shoulder (pain from collar bone into Lt Shoulder blade ). This is a new problem. Episode onset: past few weeks the Lt shoulder has been sore and then past few days ---- more pain. There has been no history of extremity trauma. The problem has been waxing and waning (pain was worse last Thurs, Friday and Saturday. ). The quality of the pain is described as aching. Pertinent negatives include no fever, limited range of motion, numbness, stiffness or tingling. He has tried nothing for the symptoms. Past Medical History:     History reviewed. No pertinent past medical history. Reviewed all health maintenance requirements and ordered appropriate tests  Health Maintenance Due   Topic Date Due    Varicella vaccine (1 of 2 - 2-dose childhood series) Never done    HIV screen  Never done       Past Surgical History:     Past Surgical History:   Procedure Laterality Date    WRIST SURGERY Right         Medications:       Prior to Admission medications    Medication Sig Start Date End Date Taking? Authorizing Provider   Multiple Vitamins-Minerals (MULTIVITAMIN PO) Take by mouth   Yes Historical Provider, MD        Allergies:       Patient has no known allergies. Social History:     Tobacco:    reports that he has never smoked. He has never used smokeless tobacco.  Alcohol:      reports current alcohol use. Drug Use:  reports no history of drug use. Family History:     History reviewed.  No pertinent family history. Review of Systems:       Review of Systems   Constitutional: Negative for fever. Musculoskeletal: Positive for arthralgias. Negative for stiffness. Lt shoulder pain   Neurological: Negative for tingling and numbness. Physical Exam:     Physical Exam  Vitals reviewed. Constitutional:       General: He is not in acute distress. Appearance: Normal appearance. He is not ill-appearing. Musculoskeletal:         General: Tenderness and deformity present. No swelling or signs of injury. Left shoulder: Deformity and bony tenderness present. No swelling, effusion, tenderness or crepitus. Normal range of motion. Normal strength. Comments: Lt upper shoulder AC joint bone mass and mildly tender to palpate. No soft tissue masses. Neurological:      Mental Status: He is alert. Vitals:  /84   Pulse 78   Wt 242 lb (109.8 kg)   SpO2 97%   BMI 34.72 kg/m²       Data:     Lab Results   Component Value Date     03/18/2022    K 4.0 03/18/2022     03/18/2022    CO2 28 03/18/2022    BUN 22 03/18/2022    CREATININE 0.95 03/18/2022    GLUCOSE 101 03/18/2022    PROT 7.2 03/18/2022    LABALBU 4.5 03/18/2022    BILITOT 1.56 03/18/2022    ALKPHOS 92 03/18/2022    AST 20 03/18/2022    ALT 10 03/18/2022     Lab Results   Component Value Date    WBC 6.2 03/18/2022    RBC 5.44 03/18/2022    HGB 15.8 03/18/2022    HCT 46.0 03/18/2022    MCV 84.7 03/18/2022    MCH 29.0 03/18/2022    MCHC 34.3 03/18/2022    RDW 13.8 03/18/2022     03/18/2022    MPV NOT REPORTED 04/11/2017     Lab Results   Component Value Date    TSH 1.94 04/11/2017     Lab Results   Component Value Date    CHOL 205 03/18/2022    HDL 58 03/18/2022    LABA1C 5.4 03/18/2022          Assessment/Plan:        1. Acute pain of left shoulder  Recommend pt have xray to further eval the bone mass. - XR SHOULDER LEFT (MIN 2 VIEWS);  Future          Return if symptoms worsen or fail to improve.       Electronically signed by Ilsa Aase, MD on 5/10/2022 at 5:23 PM

## 2022-06-07 ENCOUNTER — OFFICE VISIT (OUTPATIENT)
Dept: FAMILY MEDICINE CLINIC | Age: 37
End: 2022-06-07
Payer: COMMERCIAL

## 2022-06-07 VITALS
BODY MASS INDEX: 34.15 KG/M2 | TEMPERATURE: 98.5 F | OXYGEN SATURATION: 98 % | HEART RATE: 84 BPM | DIASTOLIC BLOOD PRESSURE: 70 MMHG | WEIGHT: 238 LBS | SYSTOLIC BLOOD PRESSURE: 116 MMHG

## 2022-06-07 DIAGNOSIS — J06.9 VIRAL URI: Primary | ICD-10-CM

## 2022-06-07 DIAGNOSIS — G93.31 POST VIRAL SYNDROME: ICD-10-CM

## 2022-06-07 DIAGNOSIS — R09.82 PND (POST-NASAL DRIP): ICD-10-CM

## 2022-06-07 PROCEDURE — G8427 DOCREV CUR MEDS BY ELIG CLIN: HCPCS | Performed by: NURSE PRACTITIONER

## 2022-06-07 PROCEDURE — 1036F TOBACCO NON-USER: CPT | Performed by: NURSE PRACTITIONER

## 2022-06-07 PROCEDURE — G8417 CALC BMI ABV UP PARAM F/U: HCPCS | Performed by: NURSE PRACTITIONER

## 2022-06-07 PROCEDURE — 99213 OFFICE O/P EST LOW 20 MIN: CPT | Performed by: NURSE PRACTITIONER

## 2022-06-07 ASSESSMENT — ENCOUNTER SYMPTOMS
VOMITING: 0
SHORTNESS OF BREATH: 0
DIARRHEA: 0
RHINORRHEA: 1
NAUSEA: 0
COUGH: 1
SINUS PAIN: 1
SORE THROAT: 1

## 2022-06-07 NOTE — LETTER
[address] [address]  Phone: [phone]  Fax: [phone]    GARO Blackwood CNP        June 7, 2022    [facility] [id number]      Dear Abraham Back:    Kent [place] 12HR 120mg twice daily (nasal decongestant)  Flonase 1 spray each nostril twice daily (nasal steroid)  Zyrtec 10mg Daily OR Claritin 10mg Daily (antihistamine)  Ibuprofen 3 times a day (antiinflammatory)  Warm tea with 1tbsp honey (soothes the throat)  Increase water intake  Rest        If you have any questions or concerns, please don't hesitate to call.     Sincerely,          GARO Blackwood CNP

## 2022-06-07 NOTE — PROGRESS NOTES
HPI Notes    Name: Miguel Castro  : 1985         Chief Complaint:     Chief Complaint   Patient presents with    URI     Patient complains of sinus pressure, cough, sore throat x 5 days. He did have positive covid test on 22. History of Present Illness:        URI   This is a new problem. The current episode started in the past 7 days. The problem has been waxing and waning. There has been no fever. Associated symptoms include congestion, coughing, rhinorrhea, sinus pain and a sore throat. Pertinent negatives include no chest pain, diarrhea, headaches, nausea or vomiting. He has tried nothing for the symptoms. Past Medical History:     No past medical history on file. Reviewed all health maintenance requirements and ordered appropriate tests  Health Maintenance Due   Topic Date Due    Varicella vaccine (1 of 2 - 2-dose childhood series) Never done    HIV screen  Never done       Past Surgical History:     Past Surgical History:   Procedure Laterality Date    WRIST SURGERY Right         Medications:       Prior to Admission medications    Medication Sig Start Date End Date Taking? Authorizing Provider   Multiple Vitamins-Minerals (MULTIVITAMIN PO) Take by mouth    Historical Provider, MD        Allergies:       Patient has no known allergies. Social History:     Tobacco:    reports that he has never smoked. He has never used smokeless tobacco.  Alcohol:      reports current alcohol use. Drug Use:  reports no history of drug use. Family History:     No family history on file. Review of Systems:         Review of Systems   Constitutional: Negative for chills and fever. HENT: Positive for congestion, rhinorrhea, sinus pain and sore throat. Respiratory: Positive for cough. Negative for shortness of breath. Cardiovascular: Negative for chest pain and palpitations. Gastrointestinal: Negative for diarrhea, nausea and vomiting.    Neurological: Negative for dizziness, seizures and headaches. Physical Exam:     Vitals:  /70   Pulse 84   Temp 98.5 °F (36.9 °C) (Oral)   Wt 238 lb (108 kg)   SpO2 98%   BMI 34.15 kg/m²       Physical Exam  Vitals and nursing note reviewed. Constitutional:       Appearance: He is well-developed. HENT:      Right Ear: Tympanic membrane normal.      Left Ear: Tympanic membrane normal.      Nose: Mucosal edema present. Mouth/Throat:      Pharynx: Posterior oropharyngeal erythema present. Cardiovascular:      Rate and Rhythm: Normal rate and regular rhythm. Heart sounds: Normal heart sounds. Pulmonary:      Effort: Pulmonary effort is normal. No respiratory distress. Breath sounds: Normal breath sounds. Neurological:      Mental Status: He is alert. Psychiatric:         Behavior: Behavior is cooperative. Data:     Lab Results   Component Value Date     03/18/2022    K 4.0 03/18/2022     03/18/2022    CO2 28 03/18/2022    BUN 22 03/18/2022    CREATININE 0.95 03/18/2022    GLUCOSE 101 03/18/2022    PROT 7.2 03/18/2022    LABALBU 4.5 03/18/2022    BILITOT 1.56 03/18/2022    ALKPHOS 92 03/18/2022    AST 20 03/18/2022    ALT 10 03/18/2022     Lab Results   Component Value Date    WBC 6.2 03/18/2022    RBC 5.44 03/18/2022    HGB 15.8 03/18/2022    HCT 46.0 03/18/2022    MCV 84.7 03/18/2022    MCH 29.0 03/18/2022    MCHC 34.3 03/18/2022    RDW 13.8 03/18/2022     03/18/2022    MPV NOT REPORTED 04/11/2017     Lab Results   Component Value Date    TSH 1.94 04/11/2017     Lab Results   Component Value Date    CHOL 205 03/18/2022    HDL 58 03/18/2022    LABA1C 5.4 03/18/2022          Assessment & Plan        Diagnosis Orders   1. Viral URI     2. PND (post-nasal drip)     3.  Post viral syndrome       Sudafed 12HR 120mg twice daily (nasal decongestant)  Flonase 1 spray each nostril twice daily (nasal steroid)  Zyrtec 10mg Daily OR Claritin 10mg Daily (antihistamine)  Ibuprofen 3 times a day (antiinflammatory)  Zinc Sulfate 50mg Daily  Vitamin C 1000mg Daily  Vitamin D3 2000IU Daily   Warm tea with 1tbsp honey (soothes the throat)  Increase water intake  Rest            Completed Refills   Requested Prescriptions      No prescriptions requested or ordered in this encounter     No follow-ups on file. No orders of the defined types were placed in this encounter. No orders of the defined types were placed in this encounter. There are no Patient Instructions on file for this visit.     Electronically signed by Katheran Saint, APRN - CNP on 6/7/2022 at 9:45 AM           Completed Refills   Requested Prescriptions      No prescriptions requested or ordered in this encounter

## 2022-09-08 ENCOUNTER — OFFICE VISIT (OUTPATIENT)
Dept: FAMILY MEDICINE CLINIC | Age: 37
End: 2022-09-08
Payer: COMMERCIAL

## 2022-09-08 VITALS
OXYGEN SATURATION: 98 % | TEMPERATURE: 98 F | HEART RATE: 74 BPM | SYSTOLIC BLOOD PRESSURE: 104 MMHG | DIASTOLIC BLOOD PRESSURE: 60 MMHG

## 2022-09-08 DIAGNOSIS — M54.16 LUMBAR RADICULOPATHY: Primary | ICD-10-CM

## 2022-09-08 PROCEDURE — G8427 DOCREV CUR MEDS BY ELIG CLIN: HCPCS | Performed by: NURSE PRACTITIONER

## 2022-09-08 PROCEDURE — 99213 OFFICE O/P EST LOW 20 MIN: CPT | Performed by: NURSE PRACTITIONER

## 2022-09-08 PROCEDURE — G8417 CALC BMI ABV UP PARAM F/U: HCPCS | Performed by: NURSE PRACTITIONER

## 2022-09-08 PROCEDURE — 1036F TOBACCO NON-USER: CPT | Performed by: NURSE PRACTITIONER

## 2022-09-08 SDOH — ECONOMIC STABILITY: FOOD INSECURITY: WITHIN THE PAST 12 MONTHS, THE FOOD YOU BOUGHT JUST DIDN'T LAST AND YOU DIDN'T HAVE MONEY TO GET MORE.: NEVER TRUE

## 2022-09-08 SDOH — ECONOMIC STABILITY: FOOD INSECURITY: WITHIN THE PAST 12 MONTHS, YOU WORRIED THAT YOUR FOOD WOULD RUN OUT BEFORE YOU GOT MONEY TO BUY MORE.: NEVER TRUE

## 2022-09-08 ASSESSMENT — ENCOUNTER SYMPTOMS
VOMITING: 0
NAUSEA: 0
DIARRHEA: 0
COUGH: 0
BOWEL INCONTINENCE: 0
SHORTNESS OF BREATH: 0
BACK PAIN: 1

## 2022-09-08 ASSESSMENT — SOCIAL DETERMINANTS OF HEALTH (SDOH): HOW HARD IS IT FOR YOU TO PAY FOR THE VERY BASICS LIKE FOOD, HOUSING, MEDICAL CARE, AND HEATING?: NOT HARD AT ALL

## 2022-09-08 NOTE — PROGRESS NOTES
HPI Notes    Name: Renata Upton  : 1985         Chief Complaint:     Chief Complaint   Patient presents with    Back Pain     Patient here today for chronic back pain. Needs [facility] paperwork updated. History of Present Illness:        Back Pain  This is a chronic problem. The current episode started more than 1 year ago. The problem occurs intermittently. The problem has been waxing and waning since onset. The pain is present in the lumbar spine. The quality of the pain is described as aching. The pain is moderate. The symptoms are aggravated by bending. Pertinent negatives include no bladder incontinence, bowel incontinence, chest pain, fever, headaches, perianal numbness, tingling or weakness. Risk factors include obesity. He has tried NSAIDs for the symptoms. The treatment provided moderate relief. Past Medical History:     No past medical history on file. Reviewed all health maintenance requirements and ordered appropriate tests  Health Maintenance Due   Topic Date Due    Varicella vaccine (1 of 2 - 2-dose childhood series) Never done    HIV screen  Never done    Flu vaccine (1) Never done       Past Surgical History:     Past Surgical History:   Procedure Laterality Date    WRIST SURGERY Right         Medications:       Prior to Admission medications    Medication Sig Start Date End Date Taking? Authorizing Provider   Multiple Vitamins-Minerals (MULTIVITAMIN PO) Take by mouth   Yes Historical Provider, MD        Allergies:       Patient has no known allergies. Social History:     Tobacco:    reports that he has never smoked. He has never used smokeless tobacco.  Alcohol:      reports current alcohol use. Drug Use:  reports no history of drug use. Family History:     No family history on file. Review of Systems:         Review of Systems   Constitutional:  Negative for chills and fever. Respiratory:  Negative for cough and shortness of breath.     Cardiovascular:  Negative for chest pain and palpitations. Gastrointestinal:  Negative for bowel incontinence, diarrhea, nausea and vomiting. Genitourinary:  Negative for bladder incontinence. Musculoskeletal:  Positive for back pain. Neurological:  Negative for dizziness, tingling, seizures, weakness and headaches. Physical Exam:     Vitals:  /60   Pulse 74   Temp 98 °F (36.7 °C) (Oral)   SpO2 98%       Physical Exam  Vitals and nursing note reviewed. Constitutional:       Appearance: He is well-developed. Cardiovascular:      Rate and Rhythm: Normal rate and regular rhythm. Heart sounds: S1 normal and S2 normal.   Pulmonary:      Effort: Pulmonary effort is normal. No respiratory distress. Breath sounds: Normal breath sounds. Abdominal:      General: Bowel sounds are normal.      Palpations: Abdomen is soft. Tenderness: There is no abdominal tenderness. Musculoskeletal:      Lumbar back: Tenderness and bony tenderness present. Normal range of motion. Comments: NO tenderness to paraspinal muscles  NO increased pain with forward bend  NO increased pain with backward bend  NO increase in pain with side to side bend  NO increased pain with truncal twisting  NO increased pain with straight leg raised test bilat  Foot and ankle strength 5/5 bilat  Knee raise strength 5/5 bilat     Skin:     General: Skin is warm and dry. Psychiatric:         Behavior: Behavior is cooperative.              Data:     Lab Results   Component Value Date/Time     03/18/2022 12:31 PM    K 4.0 03/18/2022 12:31 PM     03/18/2022 12:31 PM    CO2 28 03/18/2022 12:31 PM    BUN 22 03/18/2022 12:31 PM    CREATININE 0.95 03/18/2022 12:31 PM    GLUCOSE 101 03/18/2022 12:31 PM    PROT 7.2 03/18/2022 12:31 PM    LABALBU 4.5 03/18/2022 12:31 PM    BILITOT 1.56 03/18/2022 12:31 PM    ALKPHOS 92 03/18/2022 12:31 PM    AST 20 03/18/2022 12:31 PM    ALT 10 03/18/2022 12:31 PM     Lab Results   Component Value Date/Time WBC 6.2 03/18/2022 12:31 PM    RBC 5.44 03/18/2022 12:31 PM    HGB 15.8 03/18/2022 12:31 PM    HCT 46.0 03/18/2022 12:31 PM    MCV 84.7 03/18/2022 12:31 PM    MCH 29.0 03/18/2022 12:31 PM    MCHC 34.3 03/18/2022 12:31 PM    RDW 13.8 03/18/2022 12:31 PM     03/18/2022 12:31 PM    MPV NOT REPORTED 04/11/2017 11:07 AM     Lab Results   Component Value Date/Time    TSH 1.94 04/11/2017 11:07 AM     Lab Results   Component Value Date/Time    CHOL 205 03/18/2022 12:31 PM    HDL 58 03/18/2022 12:31 PM    LABA1C 5.4 03/18/2022 12:31 PM          Assessment & Plan        Diagnosis Orders   1. Lumbar radiculopathy          Continue using Aleve as needed. Continue stretching and exercise. Patient verbalizes understanding and agreement with plan. All questions answered. If symptoms do not resolve or worsen, return to office. Completed Refills   Requested Prescriptions      No prescriptions requested or ordered in this encounter     No follow-ups on file. No orders of the defined types were placed in this encounter. No orders of the defined types were placed in this encounter. Patient Instructions   SURVEY:    You may be receiving a survey from [employer] regarding your visit today. Please complete the survey to enable us to provide the highest quality of care to you and your family. If you cannot score us a very good (5 Stars) on any question, please call the office to discuss how we could have made your experience a very good one. Thank you.     Clinical Care Team: DI Gaspar Res, LPN    Clerical Team: Memo Gomes   Electronically signed by GARO Gaspar Res, CNP on 9/8/2022 at 2:45 PM           Completed Refills   Requested Prescriptions      No prescriptions requested or ordered in this encounter

## 2022-09-08 NOTE — PATIENT INSTRUCTIONS
SURVEY:    You may be receiving a survey from [employer] regarding your visit today. Please complete the survey to enable us to provide the highest quality of care to you and your family. If you cannot score us a very good (5 Stars) on any question, please call the office to discuss how we could have made your experience a very good one. Thank you.     Clinical Care Team: GARO Arellano-CRISTINE Sánchez LPN    Clerical Team: Matthieu Bennett

## 2022-12-21 ENCOUNTER — OFFICE VISIT (OUTPATIENT)
Dept: PRIMARY CARE CLINIC | Age: 37
End: 2022-12-21
Payer: COMMERCIAL

## 2022-12-21 VITALS
TEMPERATURE: 98.4 F | BODY MASS INDEX: 34.07 KG/M2 | SYSTOLIC BLOOD PRESSURE: 119 MMHG | OXYGEN SATURATION: 96 % | WEIGHT: 238 LBS | HEIGHT: 70 IN | HEART RATE: 89 BPM | RESPIRATION RATE: 18 BRPM | DIASTOLIC BLOOD PRESSURE: 79 MMHG

## 2022-12-21 DIAGNOSIS — J10.1 INFLUENZA A: Primary | ICD-10-CM

## 2022-12-21 DIAGNOSIS — R68.89 FLU-LIKE SYMPTOMS: ICD-10-CM

## 2022-12-21 LAB
INFLUENZA A ANTIBODY: ABNORMAL
INFLUENZA B ANTIBODY: ABNORMAL
KIT LOT NO., HCLOLOT: NORMAL
SARS-COV-2, POC: NORMAL
VALID INTERNAL CONTROL, POC: PRESENT
VENDOR AND KIT NAME POC: NORMAL

## 2022-12-21 PROCEDURE — G8484 FLU IMMUNIZE NO ADMIN: HCPCS | Performed by: NURSE PRACTITIONER

## 2022-12-21 PROCEDURE — 87804 INFLUENZA ASSAY W/OPTIC: CPT | Performed by: NURSE PRACTITIONER

## 2022-12-21 PROCEDURE — 1036F TOBACCO NON-USER: CPT | Performed by: NURSE PRACTITIONER

## 2022-12-21 PROCEDURE — 99213 OFFICE O/P EST LOW 20 MIN: CPT | Performed by: NURSE PRACTITIONER

## 2022-12-21 PROCEDURE — G8427 DOCREV CUR MEDS BY ELIG CLIN: HCPCS | Performed by: NURSE PRACTITIONER

## 2022-12-21 PROCEDURE — G8417 CALC BMI ABV UP PARAM F/U: HCPCS | Performed by: NURSE PRACTITIONER

## 2022-12-21 RX ORDER — OSELTAMIVIR PHOSPHATE 75 MG/1
75 CAPSULE ORAL 2 TIMES DAILY
Qty: 10 CAPSULE | Refills: 0 | Status: SHIPPED | OUTPATIENT
Start: 2022-12-21 | End: 2022-12-26

## 2022-12-21 ASSESSMENT — ENCOUNTER SYMPTOMS
GASTROINTESTINAL NEGATIVE: 1
SINUS PRESSURE: 1
COUGH: 1

## 2022-12-21 NOTE — PROGRESS NOTES
Chief Complaint   Influenza (Started Yesterday-body aches, fatigue, cough, headache, fever, congestion, runny nose)      History of Present Illness   Source of history provided by: patient. Radha Sims is a 42 y.o. old male who has a past medical history of: History reviewed. No pertinent past medical history. Presents to the clinic for evaluation of 1 day of body aches, fever with fatigue and headache and runny nose/congestion. No GI symptoms and has been able to eat, drink and retain. ROS   Review of Systems   Constitutional:  Positive for fatigue and fever. HENT:  Positive for congestion and sinus pressure. Respiratory:  Positive for cough. Cardiovascular: Negative. Gastrointestinal: Negative. Musculoskeletal:  Positive for arthralgias. Neurological:  Positive for headaches. Surgical History:  has a past surgical history that includes Wrist surgery (Right, 1994). Social History:  reports that he has never smoked. He has never used smokeless tobacco. He reports current alcohol use. He reports that he does not use drugs. Family History: family history is not on file. Allergies: Patient has no known allergies. Physical Exam    VS:  /79 (Site: Right Upper Arm, Position: Sitting, Cuff Size: Medium Adult)   Pulse 89   Temp 98.4 °F (36.9 °C) (Oral)   Resp 18   Ht 5' 10\" (1.778 m)   Wt 238 lb (108 kg)   SpO2 96%   BMI 34.15 kg/m²      Constitutional:  Alert, development consistent with age. NAD. HEENT:     Head: Normocephalic. NC/NT,  No maxillary or frontal sinus tenderness on palpation. Eyes: Pupils equal, round, and reactive to light and accommodation. Extraocular movements intact. No conjunctival discharge. Sclera white. Ears: TMs pearly grey, no perforations, light reflex sharp, non-bulging. Canals clear, non-erythematous, no lesions. Nose: Nares patent, no lesions. Turbinates pink/red and non-edematous.      Oral Cavity: Mucosa moist, pink, and smooth. Tonsils 1+ with no erythema or exudate. Oropharynx pink, no drainage. Neck:  Normal ROM. Supple. No anterior cervical adenopathy noted. Lungs: CTAB without wheezes, rales, or rhonchi. CV:  Regular rate and rhythm, normal heart sounds, without ectopy, gallops, or rubs. Skin:  Normal turgor. Warm, dry, without visible rash. Lymphatic: No lymphangitis or adenopathy noted. Neurological:  Oriented. Motor functions intact. Lab / Imaging Results   (All laboratory and radiology results have been personally reviewed by myself)  Labs:  No results found for this visit on 12/21/22. Imaging: All Radiology results interpreted by Radiologist unless otherwise noted. No results found. Medical Decision Making   Farley Cogan appears non-toxic, in no apparent distress and stable at time of discharge. Assessment/Plan   Merlinda Rooks was seen today for influenza. Diagnoses and all orders for this visit:    Influenza A  -     oseltamivir (TAMIFLU) 75 MG capsule; Take 1 capsule by mouth 2 times daily for 5 days    Flu-like symptoms  -     POCT Influenza A/B  -     POC COVID-19      - POC Influenza positive today. - Tamiflu discussed including administration and side effects. - Symptomatic relief discussed including:   Sudafed 12HR 120mg twice daily (nasal decongestant)  Flonase 1 spray each nostril twice daily (nasal steroid)  Zyrtec 10mg Daily OR Claritin 10mg Daily (antihistamine)   Ibuprofen 3 times a day (anti-inflammatory)  Acetaminophen as labeled (for fever)  Warm tea with 1tbsp honey (soothes the throat)   Increase water intake  Rest  Frequent cough and deep breathing exercises at least once every 2 hours. - F/U with PCP if symptoms persist. ED sooner if symptoms worsen or change. David Cardona. Gail Pacheco APRN-CNP      **This report was transcribed using voice recognition software. Every effort was made to ensure accuracy; however, inadvertent computerized transcription errors may be present.

## 2022-12-21 NOTE — PATIENT INSTRUCTIONS
SURVEY:    You may be receiving a survey from [employer] regarding your visit today. Please complete the survey to enable us to provide the highest quality of care to you and your family. If you cannot score us a very good on any question, please call the office to discuss how we could of made your experience a very good one. Thank you for letting us take care of you today. We hope all your questions were addressed. If a question was overlooked or something else comes to mind after you return home, please contact a member of your Care Team listed below.     Thank you,  Alvah Aschoff, MA      Your Care Team at [address] [facility]  Provider- DI Santos  Provider- DI Horton  [address] [place] [address]  Reception- [place], [place]      Walk-in contact numbers:       Phone: [phone]                 Fax: [phone]    Aric Bird Hours:  Mon-Thurs: 9:00 am - 5:30 pm     Friday: 8:00 am - 12:00 pm           Sat-Sun: CLOSED

## 2023-02-28 ENCOUNTER — OFFICE VISIT (OUTPATIENT)
Dept: PRIMARY CARE CLINIC | Age: 38
End: 2023-02-28
Payer: COMMERCIAL

## 2023-02-28 VITALS
RESPIRATION RATE: 18 BRPM | HEART RATE: 61 BPM | BODY MASS INDEX: 33.93 KG/M2 | SYSTOLIC BLOOD PRESSURE: 130 MMHG | HEIGHT: 70 IN | WEIGHT: 237 LBS | TEMPERATURE: 97.6 F | DIASTOLIC BLOOD PRESSURE: 84 MMHG | OXYGEN SATURATION: 95 %

## 2023-02-28 DIAGNOSIS — J01.40 ACUTE NON-RECURRENT PANSINUSITIS: Primary | ICD-10-CM

## 2023-02-28 PROCEDURE — 99213 OFFICE O/P EST LOW 20 MIN: CPT | Performed by: NURSE PRACTITIONER

## 2023-02-28 RX ORDER — AMOXICILLIN AND CLAVULANATE POTASSIUM 875; 125 MG/1; MG/1
1 TABLET, FILM COATED ORAL 2 TIMES DAILY
Qty: 20 TABLET | Refills: 0 | Status: SHIPPED | OUTPATIENT
Start: 2023-02-28 | End: 2023-03-10

## 2023-02-28 ASSESSMENT — ENCOUNTER SYMPTOMS
COUGH: 1
WHEEZING: 0
DIARRHEA: 0
SINUS PAIN: 1
SORE THROAT: 0
SINUS PRESSURE: 1
SHORTNESS OF BREATH: 0
RHINORRHEA: 0
SINUS COMPLAINT: 1
NAUSEA: 0
VOMITING: 0

## 2023-02-28 NOTE — PROGRESS NOTES
[address] [facility] WALK-IN C[facility]  [address] [facility] [address]  Dept: [phone]  Dept Fax: [phone]    Yesica Greer is a 42 y.o. male who presents to the [facility] in Care today for hismedical conditions/complaints as noted below. Yesica Greer is c/o of Sinus Problem (Started around [place] and just wont stop, post nasal drip and cough. Sinus pressure, pressure behind eyes, ears crackle. )      HPI:     Sinus Problem  This is a new problem. The current episode started more than 1 month ago (Started around [place] and just won't stop, post nasal drip and productive cough at times, sinus pressure, pressure behind eyes and ears crackle. Had Influenza end of December. ). The problem has been gradually worsening since onset. There has been no fever. His pain is at a severity of 4/10 (Sinus pressure). The pain is moderate. Associated symptoms include congestion, coughing, ear pain, headaches and sinus pressure. Pertinent negatives include no chills, diaphoresis, neck pain, shortness of breath or sore throat. Past treatments include acetaminophen (Nyquil, Dayquil and mucinex, cough drops. ). History reviewed. No pertinent past medical history. Current Outpatient Medications   Medication Sig Dispense Refill    amoxicillin-clavulanate (AUGMENTIN) 875-125 MG per tablet Take 1 tablet by mouth 2 times daily for 10 days 20 tablet 0    Multiple Vitamins-Minerals (MULTIVITAMIN PO) Take by mouth (Patient not taking: Reported on 2/28/2023)       No current facility-administered medications for this visit. No Known Allergies    :     Review of Systems   Constitutional:  Negative for appetite change, chills, diaphoresis, fatigue and fever. HENT:  Positive for congestion, ear pain, postnasal drip, sinus pressure and sinus pain. Negative for rhinorrhea and sore throat. Respiratory:  Positive for cough.  Negative for shortness of breath and wheezing. Gastrointestinal:  Negative for diarrhea, nausea and vomiting. Musculoskeletal:  Negative for neck pain. Skin:  Negative for rash and wound. Neurological:  Positive for headaches.     :     Physical Exam  Vitals and nursing note reviewed. Constitutional:       General: He is not in acute distress. Appearance: Normal appearance. He is well-developed. He is not ill-appearing or diaphoretic. Comments: Well hydrated, nontoxic appearance. Arrives ambulatory with mother. HENT:      Head: Normocephalic and atraumatic. Right Ear: Hearing, ear canal and external ear normal. No drainage. A middle ear effusion (Opaque white fluid.) is present. No mastoid tenderness. Tympanic membrane is not injected, erythematous or bulging. Left Ear: Hearing, ear canal and external ear normal. No drainage. A middle ear effusion (Opaque white fluid.) is present. No mastoid tenderness. Tympanic membrane is not injected, erythematous or bulging. Nose: Mucosal edema, congestion and rhinorrhea present. Rhinorrhea is clear. Right Sinus: Maxillary sinus tenderness and frontal sinus tenderness present. Left Sinus: Maxillary sinus tenderness and frontal sinus tenderness present. Mouth/Throat:      Lips: Pink. Mouth: Mucous membranes are moist.      Pharynx: Uvula midline. Oropharyngeal exudate (Moderate amount of thick yellow secretions to posterior pharynx.) and posterior oropharyngeal erythema (Slight erythema to posterior pharynx.) present. No pharyngeal swelling or uvula swelling. Eyes:      General:         Right eye: No discharge. Left eye: No discharge. Conjunctiva/sclera: Conjunctivae normal.      Pupils: Pupils are equal, round, and reactive to light. Cardiovascular:      Rate and Rhythm: Normal rate and regular rhythm. Heart sounds: Normal heart sounds, S1 normal and S2 normal. No murmur heard. No friction rub. No gallop.    Pulmonary: Effort: Pulmonary effort is normal. No accessory muscle usage or respiratory distress. Breath sounds: Normal breath sounds and air entry. No decreased breath sounds, wheezing, rhonchi or rales. Comments: Occasional moist cough. Breath sounds clear B/L anterior and posterior lobes. Chest expansion symmetrical.  No audible wheezing or respiratory distress. No rales or rhonchi. Abdominal:      General: Bowel sounds are normal.      Palpations: Abdomen is soft. Tenderness: There is no abdominal tenderness. Musculoskeletal:         General: Normal range of motion. Lymphadenopathy:      Cervical: No cervical adenopathy. Right cervical: No superficial or posterior cervical adenopathy. Left cervical: No superficial or posterior cervical adenopathy. Skin:     General: Skin is warm and dry. Coloration: Skin is not pale. Findings: No erythema or rash. Neurological:      Mental Status: He is alert and oriented to person, place, and time. Psychiatric:         Behavior: Behavior normal. Behavior is cooperative. /84 (Site: Right Upper Arm, Position: Sitting, Cuff Size: Medium Adult)   Pulse 61   Temp 97.6 °F (36.4 °C) (Oral)   Resp 18   Ht 5' 10\" (1.778 m)   Wt 237 lb (107.5 kg)   SpO2 95%   BMI 34.01 kg/m²     :      Diagnosis Orders   1. Acute non-recurrent pansinusitis  amoxicillin-clavulanate (AUGMENTIN) 875-125 MG per tablet          :      Return if symptoms worsen or fail to improve, for Resume all previous medications as directed.     Orders Placed This Encounter   Medications    amoxicillin-clavulanate (AUGMENTIN) 875-125 MG per tablet     Sig: Take 1 tablet by mouth 2 times daily for 10 days     Dispense:  20 tablet     Refill:  0        Encouraged to increase fluids and rest  Continue antibiotic as prescribed until all doses are completed - take with food  Probiotic OTC or greek yogurt daily while on antibiotic  Mucinex/Guaifenesin OTC as directed on package  Nasal saline spray OTC every couple of hours for nasal congestion  Fluticasone nasal spray, 1 spray each nostril, twice a day for 7 to 10 days  Warm facial packs applied to face for 5 to 10 minutes, 3 times per day  Aleve/Ibuprofen/Tylenol OTC PRN for pain, discomfort or fever  Patient instructions given for acute sinusitis and augmentin. To ER or call 911 if any difficulty breathing, shortness of breath, inability to swallow, hives, rash, facial/tongue swelling or temp greater than 103 degrees. Follow up as needed with PCP if symptoms worsen or do not improve      Dillon Jolanta received counseling on the following healthy behaviors: medication adherence, increased fluids and rest.  Patient given educational materials - see patient instructions. Discussed use, benefit, and side effects of prescribed medications. Treatment plan discussed at visit. Continue routine health care follow up. All patient questions answered. Pt voiced understanding.       Electronically signed by GARO Garza CNP on 2/28/2023 at 1:47 PM

## 2023-02-28 NOTE — PATIENT INSTRUCTIONS
SURVEY:    You may be receiving a survey from [employer] regarding your visit today. Please complete the survey to enable us to provide the highest quality of care to you and your family. If you cannot score us a very good on any question, please call the office to discuss how we could of made your experience a very good one. Thank you for letting us take care of you today. We hope all your questions were addressed. If a question was overlooked or something else comes to mind after you return home, please contact a member of your Care Team listed below. Thank you,  Arya Payne MA      Your Care Team at [address]  Provider- DI Meza  Provider- GARO Rudd-CNP  [address], [address] [facility]  Reception- [place] Sher, [address] [facility]      Walk-in contact numbers:       Phone: [phone]                 Fax: [phone]    [facility] Hours:  Mon-Thurs: 9:00 am - 5:30 pm     Friday: 8:00 am - 12:00 pm           Sat-Sun: CLOSED    Encouraged to increase fluids and rest  Continue antibiotic as prescribed until all doses are completed - take with food  Probiotic OTC or greek yogurt daily while on antibiotic  Mucinex/Guaifenesin OTC as directed on package  Nasal saline spray OTC every couple of hours for nasal congestion  Fluticasone nasal spray, 1 spray each nostril, twice a day for 7 to 10 days  Warm facial packs applied to face for 5 to 10 minutes, 3 times per day  Aleve/Ibuprofen/Tylenol OTC PRN for pain, discomfort or fever  Patient instructions given for acute sinusitis and augmentin. To ER or call 911 if any difficulty breathing, shortness of breath, inability to swallow, hives, rash, facial/tongue swelling or temp greater than 103 degrees.   Follow up as needed with PCP if symptoms worsen or do not improve

## 2023-06-01 ENCOUNTER — OFFICE VISIT (OUTPATIENT)
Dept: FAMILY MEDICINE CLINIC | Age: 38
End: 2023-06-01
Payer: COMMERCIAL

## 2023-06-01 VITALS
BODY MASS INDEX: 33.36 KG/M2 | DIASTOLIC BLOOD PRESSURE: 78 MMHG | HEART RATE: 85 BPM | SYSTOLIC BLOOD PRESSURE: 122 MMHG | HEIGHT: 70 IN | RESPIRATION RATE: 18 BRPM | OXYGEN SATURATION: 98 % | WEIGHT: 233 LBS

## 2023-06-01 DIAGNOSIS — Z13.1 ENCOUNTER FOR SCREENING EXAMINATION FOR IMPAIRED GLUCOSE REGULATION AND DIABETES MELLITUS: ICD-10-CM

## 2023-06-01 DIAGNOSIS — Z13.0 SCREENING FOR DEFICIENCY ANEMIA: ICD-10-CM

## 2023-06-01 DIAGNOSIS — Z13.220 SCREENING FOR HYPERLIPIDEMIA: ICD-10-CM

## 2023-06-01 DIAGNOSIS — Z00.00 ANNUAL PHYSICAL EXAM: Primary | ICD-10-CM

## 2023-06-01 PROCEDURE — 99395 PREV VISIT EST AGE 18-39: CPT | Performed by: NURSE PRACTITIONER

## 2023-06-01 SDOH — ECONOMIC STABILITY: HOUSING INSECURITY
IN THE LAST 12 MONTHS, WAS THERE A TIME WHEN YOU DID NOT HAVE A STEADY PLACE TO SLEEP OR SLEPT IN A SHELTER (INCLUDING NOW)?: NO

## 2023-06-01 SDOH — ECONOMIC STABILITY: FOOD INSECURITY: WITHIN THE PAST 12 MONTHS, THE FOOD YOU BOUGHT JUST DIDN'T LAST AND YOU DIDN'T HAVE MONEY TO GET MORE.: NEVER TRUE

## 2023-06-01 SDOH — ECONOMIC STABILITY: INCOME INSECURITY: HOW HARD IS IT FOR YOU TO PAY FOR THE VERY BASICS LIKE FOOD, HOUSING, MEDICAL CARE, AND HEATING?: NOT HARD AT ALL

## 2023-06-01 SDOH — ECONOMIC STABILITY: FOOD INSECURITY: WITHIN THE PAST 12 MONTHS, YOU WORRIED THAT YOUR FOOD WOULD RUN OUT BEFORE YOU GOT MONEY TO BUY MORE.: NEVER TRUE

## 2023-06-01 ASSESSMENT — ENCOUNTER SYMPTOMS
NAUSEA: 0
ABDOMINAL PAIN: 0
SORE THROAT: 0
COUGH: 0
CONSTIPATION: 0
BLOOD IN STOOL: 0
DIARRHEA: 0
SHORTNESS OF BREATH: 0
VOMITING: 0
BACK PAIN: 0

## 2023-06-01 ASSESSMENT — PATIENT HEALTH QUESTIONNAIRE - PHQ9
1. LITTLE INTEREST OR PLEASURE IN DOING THINGS: 0
SUM OF ALL RESPONSES TO PHQ QUESTIONS 1-9: 0
SUM OF ALL RESPONSES TO PHQ QUESTIONS 1-9: 0
SUM OF ALL RESPONSES TO PHQ9 QUESTIONS 1 & 2: 0
2. FEELING DOWN, DEPRESSED OR HOPELESS: 0
SUM OF ALL RESPONSES TO PHQ QUESTIONS 1-9: 0
SUM OF ALL RESPONSES TO PHQ QUESTIONS 1-9: 0

## 2023-06-01 NOTE — PROGRESS NOTES
HPI Notes    Name: Nadia Oleary  : 1985         Chief Complaint:     Chief Complaint   Patient presents with    Annual Exam     Patient in office for annual physical. Patient has no concerns. History of Present Illness:        HPI  Pt is a 41 yo male who presents for general physical. Pt father has had some colon issues and pt is wondering about getting a colonoscopy. Denies any change in bowels, denies any blood. Past Medical History:     No past medical history on file. Reviewed all health maintenance requirements and ordered appropriate tests  Health Maintenance Due   Topic Date Due    Depression Screen  2023       Past Surgical History:     Past Surgical History:   Procedure Laterality Date    WRIST SURGERY Right         Medications:       Prior to Admission medications    Medication Sig Start Date End Date Taking? Authorizing Provider   Multiple Vitamins-Minerals (MULTIVITAMIN PO) Take by mouth  Patient not taking: Reported on 2023    Historical Provider, MD        Allergies:       Patient has no known allergies. Social History:     Tobacco:    reports that he has never smoked. He has never used smokeless tobacco.  Alcohol:      reports current alcohol use. Drug Use:  reports no history of drug use. Family History:     No family history on file. Review of Systems:         Review of Systems   Constitutional:  Negative for chills and fever. HENT:  Negative for sore throat. Respiratory:  Negative for cough and shortness of breath. Cardiovascular:  Negative for chest pain, palpitations and leg swelling. Gastrointestinal:  Negative for abdominal pain, blood in stool, constipation, diarrhea, nausea and vomiting. Genitourinary:  Negative for dysuria, frequency and hematuria. Musculoskeletal:  Negative for back pain and neck pain. Skin:  Negative for rash. Neurological:  Negative for dizziness, tremors, seizures, weakness and headaches.

## 2023-06-01 NOTE — PATIENT INSTRUCTIONS
SURVEY:    You may be receiving a survey from [employer] regarding your visit today. Please complete the survey to enable us to provide the highest quality of care to you and your family. If you cannot score us a very good on any question, please call the office to discuss how we could have made your experience a very good one. Thank you.

## 2023-09-01 ENCOUNTER — HOSPITAL ENCOUNTER (OUTPATIENT)
Age: 38
Discharge: HOME | End: 2023-09-01
Payer: COMMERCIAL

## 2023-09-01 DIAGNOSIS — Z13.0 SCREENING FOR DEFICIENCY ANEMIA: ICD-10-CM

## 2023-09-01 DIAGNOSIS — Z13.220 SCREENING FOR HYPERLIPIDEMIA: ICD-10-CM

## 2023-09-01 DIAGNOSIS — Z13.1 ENCOUNTER FOR SCREENING EXAMINATION FOR IMPAIRED GLUCOSE REGULATION AND DIABETES MELLITUS: ICD-10-CM

## 2023-09-01 LAB
ALBUMIN SERPL-MCNC: 4.4 G/DL (ref 3.5–5.2)
ALP SERPL-CCNC: 92 U/L (ref 40–129)
ALT SERPL-CCNC: 16 U/L (ref 5–41)
ANION GAP SERPL CALCULATED.3IONS-SCNC: 8 MMOL/L (ref 9–17)
AST SERPL-CCNC: 15 U/L
BASOPHILS # BLD: NORMAL K/UL (ref 0–0.2)
BASOPHILS NFR BLD: NORMAL % (ref 0–2)
BILIRUB SERPL-MCNC: 1.1 MG/DL (ref 0.3–1.2)
BUN SERPL-MCNC: 21 MG/DL (ref 6–20)
BUN/CREAT SERPL: 21 (ref 9–20)
CALCIUM SERPL-MCNC: 9.8 MG/DL (ref 8.6–10.4)
CHLORIDE SERPL-SCNC: 104 MMOL/L (ref 98–107)
CHOLEST SERPL-MCNC: 211 MG/DL
CHOLESTEROL/HDL RATIO: 4.4
CO2 SERPL-SCNC: 28 MMOL/L (ref 20–31)
CREAT SERPL-MCNC: 1 MG/DL (ref 0.7–1.2)
EOSINOPHIL # BLD: 0.21 K/UL (ref 0–0.4)
EOSINOPHILS RELATIVE PERCENT: 4 % (ref 0–5)
ERYTHROCYTE [DISTWIDTH] IN BLOOD BY AUTOMATED COUNT: 12.8 % (ref 12.1–15.2)
GFR SERPL CREATININE-BSD FRML MDRD: >60 ML/MIN/1.73M2
GLUCOSE SERPL-MCNC: 99 MG/DL (ref 70–99)
HCT VFR BLD AUTO: 45.8 % (ref 41–53)
HDLC SERPL-MCNC: 48 MG/DL
HGB BLD-MCNC: 15.6 G/DL (ref 13.5–17.5)
IMM GRANULOCYTES # BLD AUTO: NORMAL K/UL (ref 0–0.3)
IMM GRANULOCYTES NFR BLD: NORMAL %
LDLC SERPL CALC-MCNC: 147 MG/DL (ref 0–130)
LYMPHOCYTES NFR BLD: 1.61 K/UL (ref 1–4.8)
LYMPHOCYTES RELATIVE PERCENT: 31 % (ref 13–44)
MCH RBC QN AUTO: 28.7 PG (ref 26–34)
MCHC RBC AUTO-ENTMCNC: 34.1 G/DL (ref 31–37)
MCV RBC AUTO: 84.3 FL (ref 80–100)
MONOCYTES NFR BLD: 0.26 K/UL (ref 0–1)
MONOCYTES NFR BLD: 5 % (ref 5–9)
MORPHOLOGY: NORMAL
NEUTROPHILS NFR BLD: 60 % (ref 39–75)
NEUTS SEG NFR BLD: 3.12 K/UL (ref 2.1–6.5)
PATIENT FASTING?: YES
PLATELET # BLD AUTO: 210 K/UL (ref 140–450)
POTASSIUM SERPL-SCNC: 4.2 MMOL/L (ref 3.7–5.3)
PROT SERPL-MCNC: 7.2 G/DL (ref 6.4–8.3)
RBC # BLD AUTO: 5.43 M/UL (ref 4.5–5.9)
SODIUM SERPL-SCNC: 140 MMOL/L (ref 135–144)
TRIGL SERPL-MCNC: 80 MG/DL
WBC OTHER # BLD: 5.2 K/UL (ref 3.5–11)

## 2023-09-01 PROCEDURE — 36415 COLL VENOUS BLD VENIPUNCTURE: CPT

## 2023-09-01 PROCEDURE — 80061 LIPID PANEL: CPT

## 2023-09-01 PROCEDURE — 80053 COMPREHEN METABOLIC PANEL: CPT

## 2023-09-01 PROCEDURE — 85025 COMPLETE CBC W/AUTO DIFF WBC: CPT

## 2023-09-13 ENCOUNTER — OFFICE VISIT (OUTPATIENT)
Dept: FAMILY MEDICINE CLINIC | Age: 38
End: 2023-09-13
Payer: COMMERCIAL

## 2023-09-13 ENCOUNTER — HOSPITAL ENCOUNTER (OUTPATIENT)
Age: 38
Discharge: HOME | End: 2023-09-13
Payer: COMMERCIAL

## 2023-09-13 VITALS — OXYGEN SATURATION: 95 % | SYSTOLIC BLOOD PRESSURE: 120 MMHG | DIASTOLIC BLOOD PRESSURE: 80 MMHG | HEART RATE: 66 BPM

## 2023-09-13 DIAGNOSIS — R42 LIGHT HEADEDNESS: Primary | ICD-10-CM

## 2023-09-13 DIAGNOSIS — R42 LIGHT HEADEDNESS: ICD-10-CM

## 2023-09-13 LAB
ALBUMIN SERPL-MCNC: 4.5 G/DL (ref 3.5–5.2)
ALP SERPL-CCNC: 88 U/L (ref 40–129)
ALT SERPL-CCNC: 13 U/L (ref 5–41)
ANION GAP SERPL CALCULATED.3IONS-SCNC: 11 MMOL/L (ref 9–17)
AST SERPL-CCNC: 15 U/L
ATYPICAL LYMPHOCYTE ABSOLUTE COUNT: 0.88 K/UL (ref 0–1)
ATYPICAL LYMPHOCYTES: 12 %
BASOPHILS # BLD: ABNORMAL K/UL (ref 0–0.2)
BASOPHILS NFR BLD: ABNORMAL % (ref 0–2)
BILIRUB SERPL-MCNC: 1.2 MG/DL (ref 0.3–1.2)
BUN SERPL-MCNC: 23 MG/DL (ref 6–20)
BUN/CREAT SERPL: 21 (ref 9–20)
CALCIUM SERPL-MCNC: 9.6 MG/DL (ref 8.6–10.4)
CHLORIDE SERPL-SCNC: 99 MMOL/L (ref 98–107)
CO2 SERPL-SCNC: 24 MMOL/L (ref 20–31)
CREAT SERPL-MCNC: 1.1 MG/DL (ref 0.7–1.2)
EOSINOPHIL # BLD: ABNORMAL K/UL (ref 0–0.4)
EOSINOPHILS RELATIVE PERCENT: ABNORMAL % (ref 0–5)
ERYTHROCYTE [DISTWIDTH] IN BLOOD BY AUTOMATED COUNT: 13.6 % (ref 12.1–15.2)
GFR SERPL CREATININE-BSD FRML MDRD: >60 ML/MIN/1.73M2
GLUCOSE SERPL-MCNC: 95 MG/DL (ref 70–99)
HCT VFR BLD AUTO: 43.2 % (ref 41–53)
HGB BLD-MCNC: 14.9 G/DL (ref 13.5–17.5)
IMM GRANULOCYTES # BLD AUTO: ABNORMAL K/UL (ref 0–0.3)
IMM GRANULOCYTES NFR BLD: ABNORMAL %
LYMPHOCYTES NFR BLD: 1.31 K/UL (ref 1–4.8)
LYMPHOCYTES RELATIVE PERCENT: 18 % (ref 13–44)
MCH RBC QN AUTO: 29.6 PG (ref 26–34)
MCHC RBC AUTO-ENTMCNC: 34.6 G/DL (ref 31–37)
MCV RBC AUTO: 85.5 FL (ref 80–100)
MONOCYTES NFR BLD: 0.73 K/UL (ref 0–1)
MONOCYTES NFR BLD: 10 % (ref 5–9)
MORPHOLOGY: ABNORMAL
NEUTROPHILS NFR BLD: 60 % (ref 39–75)
NEUTS SEG NFR BLD: 4.38 K/UL (ref 2.1–6.5)
PLATELET # BLD AUTO: 232 K/UL (ref 140–450)
POTASSIUM SERPL-SCNC: 3.9 MMOL/L (ref 3.7–5.3)
PROT SERPL-MCNC: 7 G/DL (ref 6.4–8.3)
RBC # BLD AUTO: 5.05 M/UL (ref 4.5–5.9)
SODIUM SERPL-SCNC: 134 MMOL/L (ref 135–144)
WBC OTHER # BLD: 7.3 K/UL (ref 3.5–11)

## 2023-09-13 PROCEDURE — 36415 COLL VENOUS BLD VENIPUNCTURE: CPT

## 2023-09-13 PROCEDURE — 85025 COMPLETE CBC W/AUTO DIFF WBC: CPT

## 2023-09-13 PROCEDURE — 93005 ELECTROCARDIOGRAM TRACING: CPT

## 2023-09-13 PROCEDURE — 80053 COMPREHEN METABOLIC PANEL: CPT

## 2023-09-13 PROCEDURE — 99213 OFFICE O/P EST LOW 20 MIN: CPT | Performed by: STUDENT IN AN ORGANIZED HEALTH CARE EDUCATION/TRAINING PROGRAM

## 2023-09-13 ASSESSMENT — ENCOUNTER SYMPTOMS
NAUSEA: 0
SINUS PAIN: 0
COUGH: 0
DIARRHEA: 0
WHEEZING: 0
VOMITING: 0
BACK PAIN: 0
SORE THROAT: 0
ABDOMINAL PAIN: 0

## 2023-09-13 NOTE — PROGRESS NOTES
file.    Review of Systems:     Review of Systems   Constitutional:  Negative for fever. HENT:  Negative for sinus pain, sneezing and sore throat. Respiratory:  Negative for cough and wheezing. Cardiovascular:  Positive for chest pain. Gastrointestinal:  Negative for abdominal pain, diarrhea, nausea and vomiting. Musculoskeletal:  Negative for back pain. Skin:  Negative for rash. Neurological:  Positive for dizziness and headaches. Hematological:  Negative for adenopathy. Psychiatric/Behavioral:  Negative for sleep disturbance. Physical Exam:     Vitals:  /80   Pulse 66   SpO2 95%     Physical Exam  Vitals and nursing note reviewed. Constitutional:       General: He is not in acute distress. Appearance: Normal appearance. He is normal weight. Cardiovascular:      Rate and Rhythm: Normal rate and regular rhythm. Pulses: Normal pulses. Heart sounds: Normal heart sounds. No murmur heard. Pulmonary:      Effort: Pulmonary effort is normal. No respiratory distress. Breath sounds: Normal breath sounds. No wheezing or rhonchi. Musculoskeletal:         General: No swelling or tenderness. Normal range of motion. Skin:     General: Skin is warm and dry. Capillary Refill: Capillary refill takes less than 2 seconds. Neurological:      General: No focal deficit present. Mental Status: He is alert and oriented to person, place, and time. Mental status is at baseline. Cranial Nerves: No cranial nerve deficit. Psychiatric:         Mood and Affect: Mood normal.         Behavior: Behavior normal.         Thought Content:  Thought content normal.                 Data:     Lab Results   Component Value Date/Time     09/01/2023 08:53 AM    K 4.2 09/01/2023 08:53 AM     09/01/2023 08:53 AM    CO2 28 09/01/2023 08:53 AM    BUN 21 09/01/2023 08:53 AM    CREATININE 1.0 09/01/2023 08:53 AM    GLUCOSE 99 09/01/2023 08:53 AM    PROT 7.2 09/01/2023 08:53

## 2023-09-14 LAB
EKG ATRIAL RATE: 63 BPM
EKG P AXIS: 65 DEGREES
EKG P-R INTERVAL: 184 MS
EKG Q-T INTERVAL: 408 MS
EKG QRS DURATION: 94 MS
EKG QTC CALCULATION (BAZETT): 417 MS
EKG R AXIS: 40 DEGREES
EKG T AXIS: 44 DEGREES
EKG VENTRICULAR RATE: 63 BPM

## 2023-09-26 ENCOUNTER — OFFICE VISIT (OUTPATIENT)
Dept: FAMILY MEDICINE CLINIC | Age: 38
End: 2023-09-26
Payer: COMMERCIAL

## 2023-09-26 VITALS
HEART RATE: 70 BPM | OXYGEN SATURATION: 98 % | SYSTOLIC BLOOD PRESSURE: 122 MMHG | TEMPERATURE: 97.8 F | WEIGHT: 235 LBS | BODY MASS INDEX: 33.72 KG/M2 | DIASTOLIC BLOOD PRESSURE: 80 MMHG

## 2023-09-26 DIAGNOSIS — M54.16 LUMBAR RADICULOPATHY: Primary | ICD-10-CM

## 2023-09-26 PROCEDURE — 99213 OFFICE O/P EST LOW 20 MIN: CPT | Performed by: NURSE PRACTITIONER

## 2023-09-26 ASSESSMENT — ENCOUNTER SYMPTOMS
COUGH: 0
SHORTNESS OF BREATH: 0
VOMITING: 0
DIARRHEA: 0
BOWEL INCONTINENCE: 0
NAUSEA: 0
BACK PAIN: 1

## 2023-09-26 NOTE — PATIENT INSTRUCTIONS
SURVEY:    You may be receiving a survey from [email or web address] regarding your visit today. Please complete the survey to enable us to provide the highest quality of care to you and your family. If you cannot score us a very good (5 Stars) on any question, please call the office to discuss how we could have made your experience a very good one. Thank you.     Clinical Care Team: GARO Abraham-CRISTINE Cordoba LPN    Clerical Team: 1 Jethro Nolasco

## 2023-09-26 NOTE — PROGRESS NOTES
PM     Lab Results   Component Value Date/Time    WBC 7.3 09/13/2023 04:53 PM    RBC 5.05 09/13/2023 04:53 PM    HGB 14.9 09/13/2023 04:53 PM    HCT 43.2 09/13/2023 04:53 PM    MCV 85.5 09/13/2023 04:53 PM    MCH 29.6 09/13/2023 04:53 PM    MCHC 34.6 09/13/2023 04:53 PM    RDW 13.6 09/13/2023 04:53 PM     09/13/2023 04:53 PM    MPV NOT REPORTED 04/11/2017 11:07 AM     Lab Results   Component Value Date/Time    TSH 1.94 04/11/2017 11:07 AM     Lab Results   Component Value Date/Time    CHOL 211 09/01/2023 08:53 AM    HDL 48 09/01/2023 08:53 AM    LABA1C 5.4 03/18/2022 12:31 PM          Assessment & Plan        Diagnosis Orders   1. Lumbar radiculopathy          Continue prn use of aleve. Continue stretching and good back health (proper lifting, posture etc)    Patient verbalizes understanding and agreement with plan. All questions answered. If symptoms do not resolve or worsen, return to office. Completed Refills   Requested Prescriptions      No prescriptions requested or ordered in this encounter     No follow-ups on file. No orders of the defined types were placed in this encounter. No orders of the defined types were placed in this encounter. Patient Instructions   SURVEY:    You may be receiving a survey from [employer] regarding your visit today. Please complete the survey to enable us to provide the highest quality of care to you and your family. If you cannot score us a very good (5 Stars) on any question, please call the office to discuss how we could have made your experience a very good one. Thank you. Clinical Care Team: GARO Brandt-CRISTINE Serna LPN    Clerical Team: Sly Luna   Electronically signed by Raylene Marine Arvid Dakin  on 9/26/2023 at 12:57 PM           Completed Refills   Requested Prescriptions      No prescriptions

## 2023-11-22 ENCOUNTER — OFFICE VISIT (OUTPATIENT)
Dept: FAMILY MEDICINE CLINIC | Age: 38
End: 2023-11-22
Payer: COMMERCIAL

## 2023-11-22 VITALS
DIASTOLIC BLOOD PRESSURE: 86 MMHG | TEMPERATURE: 97.6 F | HEART RATE: 76 BPM | WEIGHT: 234 LBS | OXYGEN SATURATION: 96 % | BODY MASS INDEX: 33.58 KG/M2 | SYSTOLIC BLOOD PRESSURE: 122 MMHG

## 2023-11-22 DIAGNOSIS — K29.70 VIRAL GASTRITIS: Primary | ICD-10-CM

## 2023-11-22 PROCEDURE — 99213 OFFICE O/P EST LOW 20 MIN: CPT | Performed by: NURSE PRACTITIONER

## 2023-11-22 ASSESSMENT — ENCOUNTER SYMPTOMS
DIARRHEA: 1
NAUSEA: 0
VOMITING: 0
COUGH: 0
SHORTNESS OF BREATH: 0
ABDOMINAL PAIN: 0

## 2023-11-22 NOTE — PATIENT INSTRUCTIONS
SURVEY:    You may be receiving a survey from [email or web address] regarding your visit today. Please complete the survey to enable us to provide the highest quality of care to you and your family. If you cannot score us a very good (5 Stars) on any question, please call the office to discuss how we could have made your experience a very good one. Thank you.     Clinical Care Team: GARO Doran-CRISTINE Prescott LPN    Clerical Team: 1 Jethro Gan Ala

## 2024-01-22 ASSESSMENT — PATIENT HEALTH QUESTIONNAIRE - PHQ9
1. LITTLE INTEREST OR PLEASURE IN DOING THINGS: SEVERAL DAYS
SUM OF ALL RESPONSES TO PHQ QUESTIONS 1-9: 2
SUM OF ALL RESPONSES TO PHQ9 QUESTIONS 1 & 2: 2
1. LITTLE INTEREST OR PLEASURE IN DOING THINGS: 1
2. FEELING DOWN, DEPRESSED OR HOPELESS: SEVERAL DAYS
2. FEELING DOWN, DEPRESSED OR HOPELESS: 1
SUM OF ALL RESPONSES TO PHQ QUESTIONS 1-9: 2
SUM OF ALL RESPONSES TO PHQ9 QUESTIONS 1 & 2: 2

## 2024-01-23 ENCOUNTER — OFFICE VISIT (OUTPATIENT)
Dept: FAMILY MEDICINE CLINIC | Age: 39
End: 2024-01-23
Payer: COMMERCIAL

## 2024-01-23 VITALS
SYSTOLIC BLOOD PRESSURE: 108 MMHG | BODY MASS INDEX: 34.44 KG/M2 | WEIGHT: 240 LBS | OXYGEN SATURATION: 96 % | HEART RATE: 70 BPM | DIASTOLIC BLOOD PRESSURE: 72 MMHG

## 2024-01-23 DIAGNOSIS — S93.401A SPRAIN OF RIGHT ANKLE, UNSPECIFIED LIGAMENT, INITIAL ENCOUNTER: ICD-10-CM

## 2024-01-23 DIAGNOSIS — J01.90 ACUTE BACTERIAL SINUSITIS: Primary | ICD-10-CM

## 2024-01-23 DIAGNOSIS — B96.89 ACUTE BACTERIAL SINUSITIS: Primary | ICD-10-CM

## 2024-01-23 PROCEDURE — G8417 CALC BMI ABV UP PARAM F/U: HCPCS | Performed by: NURSE PRACTITIONER

## 2024-01-23 PROCEDURE — G8427 DOCREV CUR MEDS BY ELIG CLIN: HCPCS | Performed by: NURSE PRACTITIONER

## 2024-01-23 PROCEDURE — G8484 FLU IMMUNIZE NO ADMIN: HCPCS | Performed by: NURSE PRACTITIONER

## 2024-01-23 PROCEDURE — 99213 OFFICE O/P EST LOW 20 MIN: CPT | Performed by: NURSE PRACTITIONER

## 2024-01-23 PROCEDURE — 1036F TOBACCO NON-USER: CPT | Performed by: NURSE PRACTITIONER

## 2024-01-23 RX ORDER — DOXYCYCLINE HYCLATE 100 MG
100 TABLET ORAL 2 TIMES DAILY
Qty: 14 TABLET | Refills: 0 | Status: SHIPPED | OUTPATIENT
Start: 2024-01-23 | End: 2024-01-30

## 2024-01-23 ASSESSMENT — ENCOUNTER SYMPTOMS
VOMITING: 0
SHORTNESS OF BREATH: 0
DIARRHEA: 0
NAUSEA: 0
COUGH: 0

## 2024-01-23 NOTE — PROGRESS NOTES
cooperative.               Data:     Lab Results   Component Value Date/Time     09/13/2023 04:53 PM    K 3.9 09/13/2023 04:53 PM    CL 99 09/13/2023 04:53 PM    CO2 24 09/13/2023 04:53 PM    BUN 23 09/13/2023 04:53 PM    CREATININE 1.1 09/13/2023 04:53 PM    GLUCOSE 95 09/13/2023 04:53 PM    PROT 7.0 09/13/2023 04:53 PM    LABALBU 4.5 09/13/2023 04:53 PM    BILITOT 1.2 09/13/2023 04:53 PM    ALKPHOS 88 09/13/2023 04:53 PM    AST 15 09/13/2023 04:53 PM    ALT 13 09/13/2023 04:53 PM     Lab Results   Component Value Date/Time    WBC 7.3 09/13/2023 04:53 PM    RBC 5.05 09/13/2023 04:53 PM    HGB 14.9 09/13/2023 04:53 PM    HCT 43.2 09/13/2023 04:53 PM    MCV 85.5 09/13/2023 04:53 PM    MCH 29.6 09/13/2023 04:53 PM    MCHC 34.6 09/13/2023 04:53 PM    RDW 13.6 09/13/2023 04:53 PM     09/13/2023 04:53 PM    MPV NOT REPORTED 04/11/2017 11:07 AM     Lab Results   Component Value Date/Time    TSH 1.94 04/11/2017 11:07 AM     Lab Results   Component Value Date/Time    CHOL 211 09/01/2023 08:53 AM    HDL 48 09/01/2023 08:53 AM    LABA1C 5.4 03/18/2022 12:31 PM          Assessment & Plan        Diagnosis Orders   1. Acute bacterial sinusitis   -- Will start on doxycycline.  May also try Sudafed, Flonase, Zyrtec.       2. Sprain of right ankle, unspecified ligament, initial encounter   --Patient has failed conservative treatment.  Will send for MRI. MRI ANKLE RIGHT WO CONTRAST        Patient verbalizes understanding and agreement with plan.  All questions answered. If symptoms do not resolve or worsen, return to office.                 Completed Refills   Requested Prescriptions     Signed Prescriptions Disp Refills    doxycycline hyclate (VIBRA-TABS) 100 MG tablet 14 tablet 0     Sig: Take 1 tablet by mouth 2 times daily for 7 days     No follow-ups on file.  Orders Placed This Encounter   Medications    doxycycline hyclate (VIBRA-TABS) 100 MG tablet     Sig: Take 1 tablet by mouth 2 times daily for 7 days

## 2024-01-30 ENCOUNTER — HOSPITAL ENCOUNTER (OUTPATIENT)
Dept: MRI IMAGING | Age: 39
Discharge: HOME | End: 2024-02-01
Payer: COMMERCIAL

## 2024-01-30 DIAGNOSIS — S93.401A SPRAIN OF RIGHT ANKLE, UNSPECIFIED LIGAMENT, INITIAL ENCOUNTER: ICD-10-CM

## 2024-01-30 PROCEDURE — 73721 MRI JNT OF LWR EXTRE W/O DYE: CPT

## 2024-01-31 ENCOUNTER — TELEPHONE (OUTPATIENT)
Dept: FAMILY MEDICINE CLINIC | Age: 39
End: 2024-01-31

## 2024-01-31 DIAGNOSIS — S93.409A COMPLETE TEAR OF LIGAMENT OF ANKLE: Primary | ICD-10-CM

## 2024-01-31 NOTE — TELEPHONE ENCOUNTER
----- Message from Osiris Bowen MA sent at 1/31/2024  8:41 AM EST -----  Patient would like to go to Dr. Mcclendon in [place]. Made aware of results and patient confirms understanding.

## 2024-10-18 ENCOUNTER — HOSPITAL ENCOUNTER (OUTPATIENT)
Age: 39
Discharge: HOME | End: 2024-10-20
Payer: COMMERCIAL

## 2024-10-18 ENCOUNTER — HOSPITAL ENCOUNTER (OUTPATIENT)
Age: 39
Discharge: HOME | End: 2024-10-18
Payer: COMMERCIAL

## 2024-10-18 LAB
ANION GAP SERPL CALCULATED.3IONS-SCNC: 9 MMOL/L (ref 9–17)
BASOPHILS # BLD: 0.02 K/UL (ref 0–0.2)
BASOPHILS NFR BLD: 0 % (ref 0–2)
BUN SERPL-MCNC: 19 MG/DL (ref 6–20)
BUN/CREAT SERPL: 19 (ref 9–20)
CALCIUM SERPL-MCNC: 9.1 MG/DL (ref 8.6–10.4)
CHLORIDE SERPL-SCNC: 101 MMOL/L (ref 98–107)
CO2 SERPL-SCNC: 28 MMOL/L (ref 20–31)
CREAT SERPL-MCNC: 1 MG/DL (ref 0.7–1.2)
EKG ATRIAL RATE: 58 BPM
EKG P AXIS: 33 DEGREES
EKG P-R INTERVAL: 178 MS
EKG Q-T INTERVAL: 398 MS
EKG QRS DURATION: 94 MS
EKG QTC CALCULATION (BAZETT): 390 MS
EKG R AXIS: 13 DEGREES
EKG T AXIS: 21 DEGREES
EKG VENTRICULAR RATE: 58 BPM
EOSINOPHIL # BLD: 0.27 K/UL (ref 0–0.4)
EOSINOPHILS RELATIVE PERCENT: 4 % (ref 0–5)
ERYTHROCYTE [DISTWIDTH] IN BLOOD BY AUTOMATED COUNT: 12.8 % (ref 12.1–15.2)
GFR, ESTIMATED: >90 ML/MIN/1.73M2
GLUCOSE SERPL-MCNC: 81 MG/DL (ref 70–99)
HCT VFR BLD AUTO: 44.8 % (ref 41–53)
HGB BLD-MCNC: 15.6 G/DL (ref 13.5–17.5)
IMM GRANULOCYTES # BLD AUTO: 0.02 K/UL (ref 0–0.3)
IMM GRANULOCYTES NFR BLD: 0 % (ref 0–5)
LYMPHOCYTES NFR BLD: 2.14 K/UL (ref 1–4.8)
LYMPHOCYTES RELATIVE PERCENT: 30 % (ref 13–44)
MCH RBC QN AUTO: 29.3 PG (ref 26–34)
MCHC RBC AUTO-ENTMCNC: 34.8 G/DL (ref 31–37)
MCV RBC AUTO: 84.2 FL (ref 80–100)
MONOCYTES NFR BLD: 0.62 K/UL (ref 0–1)
MONOCYTES NFR BLD: 9 % (ref 5–9)
NEUTROPHILS NFR BLD: 57 % (ref 39–75)
NEUTS SEG NFR BLD: 4.09 K/UL (ref 2.1–6.5)
PLATELET # BLD AUTO: 241 K/UL (ref 140–450)
PMV BLD AUTO: 10.1 FL (ref 6–12)
POTASSIUM SERPL-SCNC: 4.2 MMOL/L (ref 3.7–5.3)
RBC # BLD AUTO: 5.32 M/UL (ref 4.5–5.9)
SODIUM SERPL-SCNC: 138 MMOL/L (ref 135–144)
WBC OTHER # BLD: 7.2 K/UL (ref 3.5–11)

## 2024-10-18 PROCEDURE — 93005 ELECTROCARDIOGRAM TRACING: CPT | Performed by: PODIATRIST

## 2024-10-18 PROCEDURE — 85025 COMPLETE CBC W/AUTO DIFF WBC: CPT

## 2024-10-18 PROCEDURE — 36415 COLL VENOUS BLD VENIPUNCTURE: CPT

## 2024-10-18 PROCEDURE — 80048 BASIC METABOLIC PNL TOTAL CA: CPT

## 2024-10-21 LAB
EKG ATRIAL RATE: 58 BPM
EKG P AXIS: 33 DEGREES
EKG P-R INTERVAL: 178 MS
EKG Q-T INTERVAL: 398 MS
EKG QRS DURATION: 94 MS
EKG QTC CALCULATION (BAZETT): 390 MS
EKG R AXIS: 13 DEGREES
EKG T AXIS: 21 DEGREES
EKG VENTRICULAR RATE: 58 BPM

## 2024-10-21 PROCEDURE — 93010 ELECTROCARDIOGRAM REPORT: CPT | Performed by: INTERNAL MEDICINE

## 2024-10-22 RX ORDER — DIPHENHYDRAMINE HCL 25 MG
25 TABLET ORAL EVERY 6 HOURS PRN
COMMUNITY

## 2024-10-22 NOTE — PROGRESS NOTES
Patient instructed on the pre-operative, intra-operative, and post-operative process. Patient instructed on NPO status. Medication instructions and pre operative instruction sheet reviewed with the patient. Anti-bacterial soap skin prep instructions reviewed with patient. Patient will hold multivitamin starting today.

## 2024-10-23 ENCOUNTER — ANESTHESIA EVENT (OUTPATIENT)
Dept: OPERATING ROOM | Age: 39
End: 2024-10-23
Payer: COMMERCIAL

## 2024-10-24 ENCOUNTER — HOSPITAL ENCOUNTER (OUTPATIENT)
Age: 39
Setting detail: OUTPATIENT SURGERY
Discharge: HOME | End: 2024-10-24
Attending: PODIATRIST | Admitting: PODIATRIST
Payer: COMMERCIAL

## 2024-10-24 ENCOUNTER — ANESTHESIA (OUTPATIENT)
Dept: OPERATING ROOM | Age: 39
End: 2024-10-24
Payer: COMMERCIAL

## 2024-10-24 VITALS
DIASTOLIC BLOOD PRESSURE: 69 MMHG | OXYGEN SATURATION: 96 % | WEIGHT: 243.8 LBS | TEMPERATURE: 97.1 F | RESPIRATION RATE: 16 BRPM | HEART RATE: 66 BPM | HEIGHT: 70 IN | SYSTOLIC BLOOD PRESSURE: 110 MMHG | BODY MASS INDEX: 34.9 KG/M2

## 2024-10-24 PROCEDURE — C1713 ANCHOR/SCREW BN/BN,TIS/BN: HCPCS | Performed by: PODIATRIST

## 2024-10-24 PROCEDURE — 7100000011 HC PHASE II RECOVERY - ADDTL 15 MIN: Performed by: PODIATRIST

## 2024-10-24 PROCEDURE — 64445 NJX AA&/STRD SCIATIC NRV IMG: CPT | Performed by: NURSE ANESTHETIST, CERTIFIED REGISTERED

## 2024-10-24 PROCEDURE — 3600000003 HC SURGERY LEVEL 3 BASE: Performed by: PODIATRIST

## 2024-10-24 PROCEDURE — 2709999900 HC NON-CHARGEABLE SUPPLY: Performed by: PODIATRIST

## 2024-10-24 PROCEDURE — 2580000003 HC RX 258: Performed by: PODIATRIST

## 2024-10-24 PROCEDURE — 7100000010 HC PHASE II RECOVERY - FIRST 15 MIN: Performed by: PODIATRIST

## 2024-10-24 PROCEDURE — 6360000002 HC RX W HCPCS: Performed by: NURSE ANESTHETIST, CERTIFIED REGISTERED

## 2024-10-24 PROCEDURE — 3600000013 HC SURGERY LEVEL 3 ADDTL 15MIN: Performed by: PODIATRIST

## 2024-10-24 PROCEDURE — 2500000003 HC RX 250 WO HCPCS: Performed by: NURSE ANESTHETIST, CERTIFIED REGISTERED

## 2024-10-24 PROCEDURE — 6370000000 HC RX 637 (ALT 250 FOR IP): Performed by: NURSE ANESTHETIST, CERTIFIED REGISTERED

## 2024-10-24 PROCEDURE — 2720000010 HC SURG SUPPLY STERILE: Performed by: PODIATRIST

## 2024-10-24 PROCEDURE — 3700000001 HC ADD 15 MINUTES (ANESTHESIA): Performed by: PODIATRIST

## 2024-10-24 PROCEDURE — 2580000003 HC RX 258: Performed by: NURSE ANESTHETIST, CERTIFIED REGISTERED

## 2024-10-24 PROCEDURE — 6360000002 HC RX W HCPCS: Performed by: PODIATRIST

## 2024-10-24 PROCEDURE — 3700000000 HC ANESTHESIA ATTENDED CARE: Performed by: PODIATRIST

## 2024-10-24 DEVICE — ICONIX 2 NEEDLES WITH INTELLIBRAID TECHNOLOGY, 2.3MM ANCHOR WITH 2.0MM XBRAID TT
Type: IMPLANTABLE DEVICE | Site: ANKLE | Status: FUNCTIONAL
Brand: ICONIX

## 2024-10-24 DEVICE — ICONIX SPEED ANCHOR 2.3MM 1.2MM AND 2.0MM XBRAID TT SUTURE TAPE
Type: IMPLANTABLE DEVICE | Site: ANKLE | Status: FUNCTIONAL
Brand: ICONIX

## 2024-10-24 DEVICE — 4.5MM STRYKER REELX STT ANCHOR
Type: IMPLANTABLE DEVICE | Site: ANKLE | Status: FUNCTIONAL
Brand: REELX

## 2024-10-24 RX ORDER — SODIUM CHLORIDE 9 MG/ML
INJECTION, SOLUTION INTRAVENOUS PRN
Status: CANCELLED | OUTPATIENT
Start: 2024-10-24

## 2024-10-24 RX ORDER — SODIUM CHLORIDE 0.9 % (FLUSH) 0.9 %
5-40 SYRINGE (ML) INJECTION PRN
Status: DISCONTINUED | OUTPATIENT
Start: 2024-10-24 | End: 2024-10-24 | Stop reason: HOSPADM

## 2024-10-24 RX ORDER — MIDAZOLAM HYDROCHLORIDE 1 MG/ML
INJECTION, SOLUTION INTRAMUSCULAR; INTRAVENOUS
Status: DISCONTINUED | OUTPATIENT
Start: 2024-10-24 | End: 2024-10-24 | Stop reason: SDUPTHER

## 2024-10-24 RX ORDER — NALOXONE HYDROCHLORIDE 0.4 MG/ML
INJECTION, SOLUTION INTRAMUSCULAR; INTRAVENOUS; SUBCUTANEOUS PRN
Status: DISCONTINUED | OUTPATIENT
Start: 2024-10-24 | End: 2024-10-24 | Stop reason: HOSPADM

## 2024-10-24 RX ORDER — KETOROLAC TROMETHAMINE 30 MG/ML
INJECTION, SOLUTION INTRAMUSCULAR; INTRAVENOUS
Status: DISCONTINUED | OUTPATIENT
Start: 2024-10-24 | End: 2024-10-24 | Stop reason: SDUPTHER

## 2024-10-24 RX ORDER — ONDANSETRON 2 MG/ML
INJECTION INTRAMUSCULAR; INTRAVENOUS
Status: DISCONTINUED | OUTPATIENT
Start: 2024-10-24 | End: 2024-10-24 | Stop reason: SDUPTHER

## 2024-10-24 RX ORDER — DEXAMETHASONE SODIUM PHOSPHATE 4 MG/ML
INJECTION, SOLUTION INTRA-ARTICULAR; INTRALESIONAL; INTRAMUSCULAR; INTRAVENOUS; SOFT TISSUE
Status: DISCONTINUED | OUTPATIENT
Start: 2024-10-24 | End: 2024-10-24 | Stop reason: SDUPTHER

## 2024-10-24 RX ORDER — ACETAMINOPHEN 325 MG/1
650 TABLET ORAL ONCE
Status: COMPLETED | OUTPATIENT
Start: 2024-10-24 | End: 2024-10-24

## 2024-10-24 RX ORDER — PROPOFOL 10 MG/ML
INJECTION, EMULSION INTRAVENOUS
Status: DISCONTINUED | OUTPATIENT
Start: 2024-10-24 | End: 2024-10-24 | Stop reason: SDUPTHER

## 2024-10-24 RX ORDER — SODIUM CHLORIDE 0.9 % (FLUSH) 0.9 %
5-40 SYRINGE (ML) INJECTION PRN
Status: CANCELLED | OUTPATIENT
Start: 2024-10-24

## 2024-10-24 RX ORDER — SODIUM CHLORIDE 0.9 % (FLUSH) 0.9 %
5-40 SYRINGE (ML) INJECTION EVERY 12 HOURS SCHEDULED
Status: DISCONTINUED | OUTPATIENT
Start: 2024-10-24 | End: 2024-10-24 | Stop reason: HOSPADM

## 2024-10-24 RX ORDER — SODIUM CHLORIDE 0.9 % (FLUSH) 0.9 %
5-40 SYRINGE (ML) INJECTION EVERY 12 HOURS SCHEDULED
Status: CANCELLED | OUTPATIENT
Start: 2024-10-24

## 2024-10-24 RX ORDER — DIMENHYDRINATE 50 MG
50 TABLET ORAL ONCE
Status: COMPLETED | OUTPATIENT
Start: 2024-10-24 | End: 2024-10-24

## 2024-10-24 RX ORDER — FENTANYL CITRATE 50 UG/ML
INJECTION, SOLUTION INTRAMUSCULAR; INTRAVENOUS
Status: DISCONTINUED | OUTPATIENT
Start: 2024-10-24 | End: 2024-10-24 | Stop reason: SDUPTHER

## 2024-10-24 RX ORDER — OXYCODONE HYDROCHLORIDE 5 MG/1
5 TABLET ORAL
Status: DISCONTINUED | OUTPATIENT
Start: 2024-10-24 | End: 2024-10-24 | Stop reason: HOSPADM

## 2024-10-24 RX ORDER — SODIUM CHLORIDE 9 MG/ML
INJECTION, SOLUTION INTRAVENOUS PRN
Status: DISCONTINUED | OUTPATIENT
Start: 2024-10-24 | End: 2024-10-24 | Stop reason: HOSPADM

## 2024-10-24 RX ORDER — ONDANSETRON 2 MG/ML
4 INJECTION INTRAMUSCULAR; INTRAVENOUS
Status: DISCONTINUED | OUTPATIENT
Start: 2024-10-24 | End: 2024-10-24 | Stop reason: HOSPADM

## 2024-10-24 RX ORDER — METOCLOPRAMIDE HYDROCHLORIDE 5 MG/ML
INJECTION INTRAMUSCULAR; INTRAVENOUS
Status: DISCONTINUED | OUTPATIENT
Start: 2024-10-24 | End: 2024-10-24 | Stop reason: SDUPTHER

## 2024-10-24 RX ORDER — FENTANYL CITRATE 50 UG/ML
25 INJECTION, SOLUTION INTRAMUSCULAR; INTRAVENOUS EVERY 5 MIN PRN
Status: DISCONTINUED | OUTPATIENT
Start: 2024-10-24 | End: 2024-10-24 | Stop reason: HOSPADM

## 2024-10-24 RX ORDER — METOCLOPRAMIDE HYDROCHLORIDE 5 MG/ML
10 INJECTION INTRAMUSCULAR; INTRAVENOUS
Status: DISCONTINUED | OUTPATIENT
Start: 2024-10-24 | End: 2024-10-24 | Stop reason: HOSPADM

## 2024-10-24 RX ORDER — FENTANYL CITRATE 50 UG/ML
50 INJECTION, SOLUTION INTRAMUSCULAR; INTRAVENOUS EVERY 5 MIN PRN
Status: DISCONTINUED | OUTPATIENT
Start: 2024-10-24 | End: 2024-10-24 | Stop reason: HOSPADM

## 2024-10-24 RX ORDER — SODIUM CHLORIDE, SODIUM LACTATE, POTASSIUM CHLORIDE, CALCIUM CHLORIDE 600; 310; 30; 20 MG/100ML; MG/100ML; MG/100ML; MG/100ML
INJECTION, SOLUTION INTRAVENOUS CONTINUOUS
Status: DISCONTINUED | OUTPATIENT
Start: 2024-10-24 | End: 2024-10-24 | Stop reason: HOSPADM

## 2024-10-24 RX ORDER — LIDOCAINE HYDROCHLORIDE 20 MG/ML
INJECTION, SOLUTION EPIDURAL; INFILTRATION; INTRACAUDAL; PERINEURAL
Status: DISCONTINUED | OUTPATIENT
Start: 2024-10-24 | End: 2024-10-24 | Stop reason: SDUPTHER

## 2024-10-24 RX ORDER — ROPIVACAINE HYDROCHLORIDE 5 MG/ML
INJECTION, SOLUTION EPIDURAL; INFILTRATION; PERINEURAL
Status: DISCONTINUED | OUTPATIENT
Start: 2024-10-24 | End: 2024-10-24 | Stop reason: SDUPTHER

## 2024-10-24 RX ORDER — DEXAMETHASONE SODIUM PHOSPHATE 10 MG/ML
INJECTION, SOLUTION INTRAMUSCULAR; INTRAVENOUS
Status: DISCONTINUED | OUTPATIENT
Start: 2024-10-24 | End: 2024-10-24 | Stop reason: SDUPTHER

## 2024-10-24 RX ADMIN — ACETAMINOPHEN 650 MG: 325 TABLET ORAL at 06:43

## 2024-10-24 RX ADMIN — FENTANYL CITRATE 100 MCG: 50 INJECTION INTRAMUSCULAR; INTRAVENOUS at 08:11

## 2024-10-24 RX ADMIN — LIDOCAINE HYDROCHLORIDE 100 MG: 20 INJECTION, SOLUTION EPIDURAL; INFILTRATION; INTRACAUDAL; PERINEURAL at 08:24

## 2024-10-24 RX ADMIN — DEXAMETHASONE SODIUM PHOSPHATE 10 MG: 10 INJECTION, SOLUTION INTRAMUSCULAR; INTRAVENOUS at 08:18

## 2024-10-24 RX ADMIN — ROPIVACAINE HYDROCHLORIDE 20 ML: 5 INJECTION EPIDURAL; INFILTRATION; PERINEURAL at 08:18

## 2024-10-24 RX ADMIN — SODIUM CHLORIDE, POTASSIUM CHLORIDE, SODIUM LACTATE AND CALCIUM CHLORIDE: 600; 310; 30; 20 INJECTION, SOLUTION INTRAVENOUS at 08:00

## 2024-10-24 RX ADMIN — METOCLOPRAMIDE 10 MG: 5 INJECTION, SOLUTION INTRAMUSCULAR; INTRAVENOUS at 08:39

## 2024-10-24 RX ADMIN — DEXAMETHASONE SODIUM PHOSPHATE 4 MG: 4 INJECTION INTRA-ARTICULAR; INTRALESIONAL; INTRAMUSCULAR; INTRAVENOUS; SOFT TISSUE at 08:39

## 2024-10-24 RX ADMIN — MIDAZOLAM 2 MG: 1 INJECTION INTRAMUSCULAR; INTRAVENOUS at 08:11

## 2024-10-24 RX ADMIN — KETOROLAC TROMETHAMINE 30 MG: 30 INJECTION, SOLUTION INTRAMUSCULAR at 09:39

## 2024-10-24 RX ADMIN — PROPOFOL 200 MG: 10 INJECTION, EMULSION INTRAVENOUS at 08:24

## 2024-10-24 RX ADMIN — ONDANSETRON 4 MG: 2 INJECTION, SOLUTION INTRAMUSCULAR; INTRAVENOUS at 09:39

## 2024-10-24 RX ADMIN — WATER 2000 MG: 1 INJECTION INTRAMUSCULAR; INTRAVENOUS; SUBCUTANEOUS at 08:32

## 2024-10-24 RX ADMIN — DIMENHYDRINATE 50 MG: 50 TABLET ORAL at 06:43

## 2024-10-24 ASSESSMENT — PAIN SCALES - GENERAL
PAINLEVEL_OUTOF10: 0

## 2024-10-24 NOTE — ANESTHESIA PROCEDURE NOTES
Peripheral Block    Patient location during procedure: pre-op  Reason for block: post-op pain management and at surgeon's request  Start time: 10/24/2024 8:13 AM  End time: 10/24/2024 8:18 AM  Staffing  Performed: resident/CRNA   Resident/CRNA: Liberty Wheat APRN - CRNA  Performed by: Liberty Wheat APRN - CRNA  Authorized by: Liberty Wheat APRN - CRNA    Preanesthetic Checklist  Completed: patient identified, IV checked, site marked, risks and benefits discussed, surgical/procedural consents, equipment checked, pre-op evaluation, timeout performed, anesthesia consent given, oxygen available, monitors applied/VS acknowledged, fire risk safety assessment completed and verbalized and blood product R/B/A discussed and consented  Peripheral Block   Patient position: left lateral decubitus  Prep: ChloraPrep  Provider prep: mask and sterile gloves  Patient monitoring: continuous pulse ox, responsive to questions and IV access  Block type: Sciatic  Popliteal  Laterality: right  Injection technique: single-shot  Guidance: ultrasound guided  Local infiltration: ropivacaine and decadron  Infiltration strength: 0.5 %  Local infiltration: ropivacaine and decadron  Dose: 50 mLDose: 1 mL    Needle   Needle type: pjaunk 80m stim.   Needle localization: nerve stimulator and ultrasound guidance  Assessment   Injection assessment: negative aspiration for heme, no paresthesia on injection, local visualized surrounding nerve on ultrasound and no intravascular symptoms  Paresthesia pain: none  Slow fractionated injection: yes  Hemodynamics: stable  Outcomes: uncomplicated    Additional Notes  Twitch diminished <0.3

## 2024-10-24 NOTE — PROGRESS NOTES
Discharge instructions reviewed with pt and wife Cynthia. All questions answered. Pt verbalizes readiness to go home.    Discharge Criteria    Inpatients must meet Criteria 1 through 7. All other patients are either YES or N/A. If a NO is chosen then Anesthesia or Surgeon must be notified.      1.  Minimum 30 minutes after last dose of sedative medication.    Yes      2.  Systolic BP between 90 - 160. Diastolic BP between 60 - 90.    Yes      3.  Pulse between 60 - 120    Yes      4.  Respirations between 8 - 25.    Yes      5.  SpO2 92% - 100%.    Yes      6.  Able to cough and swallow or return to baseline function.    Yes      7.  Alert and oriented or return to baseline mental status.    Yes      8.  Demonstrates controlled, coordinated movements, ambulates with steady gait, or return to baseline activity function.    Yes      9.  Minimal or no pain or nausea, or at a level tolerable and acceptable to patient.    Yes      10. Takes and retains oral fluids as allowed.    Yes      11. Procedural / perioperative site stable.  Minimal or no bleeding.    Yes          12. If GI endoscopy procedure, minimal or no abdominal distention or passing flatus.    N/A      13. Written discharge instructions and emergency telephone number provided.    Yes      14. Accompanied by a responsible adult.    Yes

## 2024-10-24 NOTE — H&P
Department of Surgery  H&P Interval Note  Outpatient History and Physical was reviewed pre-operatively, with no interval changes to note prior to scheduled surgical intervention. Patient was assessed, all questions/concerns regarding rebecca-operative management were addressed to patient satisfaction. Operative site was marked prior to transportation to the operative room.     Jr Martel D.P.M.

## 2024-10-24 NOTE — BRIEF OP NOTE
Brief Postoperative Note      Patient: Alex Fry  YOB: 1985  MRN: [id number]    Date of Procedure: 10/24/2024    Pre-Op Diagnosis Codes:   Right ankle instability  Right peroneal tenosynovitis  Right peroneal brevis tendon tear  Malunited right fibular fracture    Post-Op Diagnosis: Same       Procedure(s):  Right peroneal tenosynovectomy  Right peroneus brevis tendon debridement  Right peroneal Tenodesis  Right open repair peroneal subluxation  Right open resection malunited fibular fracture  Right anterior talofibular ligament reconstruction    Surgeon(s):  Jr Martel DPM    Assistant:  Tiara Reid PGYIII    Anesthesia: General    Estimated Blood Loss (mL): Minimal    Complications: None    Specimens:   * No specimens in log *    Implants:  Implant Name Type Inv. Item Serial No.  Lot No. LRB No. Used Action   ANCHOR SUT 23MM 12MM 1 STRND SELF PUNCHING ICONIX - [id number]  ANCHOR SUT 23MM 12MM 1 STRND SELF PUNCHING ICONIX  [employer] KA[employer]WD [id number] Right 1 Implanted   ANCHOR SUT 23MM 12MM 1 STRND SELF PUNCHING ICONIX - [id number]  ANCHOR SUT 23MM 12MM 1 STRND SELF PUNCHING ICONIX  [employer]WD [id number] Right 1 Implanted   ANCHOR SUT OD23MM DBL LD ICONIX 2 NDL XBRAID TT - [id number]  ANCHOR SUT OD23MM DBL LD ICONIX 2 NDL XBRAID TT  [employer]WD [id number] Right 1 Implanted   ANCHOR SUT DIA4.5MM KNOTLESS PEEK REELX STT - [id number]  ANCHOR SUT DIA4.5MM KNOTLESS PEEK REELX STT  [employer] ENDOSCOPY[employer]WD [id number] Right 1 Implanted         Drains: * No LDAs found *    Findings:  Infection Present At Time Of Surgery (PATOS) (choose all levels that have infection present):  No infection present  Other Findings:     Electronically signed by Jr Martel DPM on 10/24/2024 at 10:14 AM

## 2024-10-24 NOTE — DISCHARGE INSTRUCTIONS
Discharge instructions:    Fluids and Diet  -begin with clear liquids, bouillon, dry toast, soda crackers  -If not nauseated, you may resume a regular diet when you desire    Medications  -Take prescription medications only as directed  -If pain is not severe, you may take the non-prescription medication that you normally take  -You may resume your prescription medication scheduled  -If any side effects or adverse reactions occur, discontinue the medication and contact your doctor  -review the patient drug information leaflet, when provided, before you begin taking the medication    Ambulation and Activity )  -You are advised to go directly home from the hospital  -Use the prescribed walking aids                            - crutches/roll-a-bout/walker and surgical boot   -Do no lift or move heavy objects  -Do not Drive    Post Anesthesia Instructions  -Do not drive or operate machinery  -Do not consume alcohol, tranquilizers, sleeping medications, or a non-prescription pain medication  -Do not make important decisions  -You should have someone home with you tonight    Care of the Operative Site  -Keep cast or bandage clean, dry, and intact  -Do not shower  -Do not remove bandage  -Elevate Operative extremity as much as possible to reduce swelling and discomfort for the first 72 hours  -Apply ice bag wrapped in thick towel over bandage 20 minutes of every hour for the first 72 hours while awake  -Do not attempt to put anything between the cast or dressing ans skin, some itching is normal    Special Instructions: Call doctor immediately if you develop any of the following:  -Fever over 100 degrees by mouth - take your temperature daily  -Pain not relieved by medication ordered  -Swelling, increased redness, warmth, or hardness around operative area  -Numb, tingling or cold toes  -Toe(s) become white or bluish  -Bandage becomes wet, soiled, or blood soaked (a small amount of bleeding may be normal)  -Increasing or  progressive drainage from surgical area    Call the Office for any other questions or concerns: [phone]    GARO Sterling CNP   [facility]   Electronically signed by GARO Sterling CNP on 10/24/2024 at 10:09 AM          SAME DAY SURGERY DISCHARGE INSTRUCTIONS    1.  Do not drive or operate hazardous machinery for 24 hours.    2.  Do not make important personal or business decisions for 24 hours.    3.  Do not drink alcoholic beverages for 24 hours.    4.  Do not smoke tobacco products for 24 hours.    5.  Eat light foods (Jell-O, soups, etc....) and drink plenty of fluids (water, Sprite, etc...) up to 8 glasses per day, as you can tolerate.    6.  If your bandages become soaked with bright red blood, place another dressing pad over your bandages.  (DO NOT remove original bandage.)  Call your surgeon for further instructions.  A small amount of bright red blood is to be expected.    7.  Limit your activities for 24 hours.  Do not engage in heavy work until your surgeon gives you permission.      8.  Patient should not be left alone for 12-24 hours following surgical procedure.    9.  Report the following signs or any questions regarding your physical condition to your surgeon immediately:    Excessive swelling of, or around the wound area.    Redness.    Temperature of 100 degrees (F) or above.    Excessive pain.    10.  Call your surgeon for any questions regarding your surgery.    11.  Wash hands before and after incision care.  It is important to practice good personal hygiene during the post op period.    Discharge Instructions for Peripheral Nerve Block     The following nerve block was performed for your surgery: ___________________Popliteal __________________________.    After the block, you will be unable to have control of the limb and this can last between 3-36 hours.      1. For leg surgery, get help to move around until the feeling in your leg returns.      2. Avoid

## 2024-10-24 NOTE — ANESTHESIA PRE PROCEDURE
alcohol     Types: 3 Cans of beer, 1 Drinks containing 0.5 oz of alcohol per week     Comment: occassional                                Counseling given: Not Answered  Tobacco comments: Smoked as a teen until mid twenties      Vital Signs (Current):   Vitals:    10/22/24 1456 10/24/24 0629 10/24/24 0638   BP:   133/75   Pulse:   68   Resp:   17   Temp:   (!) 35.9 °C (96.7 °F)   TempSrc:   Temporal   SpO2:   96%   Weight: 111.1 kg (245 lb) 110.6 kg (243 lb 12.8 oz)    Height: 1.778 m (5' 10\") 1.778 m (5' 10\")                                               BP Readings from Last 3 Encounters:   10/24/24 133/75   01/23/24 108/72   11/22/23 122/86       NPO Status: Time of last liquid consumption: 1830                        Time of last solid consumption: 0430                        Date of last liquid consumption: 10/23/24                        Date of last solid food consumption: 10/24/24    BMI:   Wt Readings from Last 3 Encounters:   10/24/24 110.6 kg (243 lb 12.8 oz)   01/23/24 108.9 kg (240 lb)   11/22/23 106.1 kg (234 lb)     Body mass index is 34.98 kg/m².    CBC:   Lab Results   Component Value Date/Time    WBC 7.2 10/18/2024 01:13 PM    RBC 5.32 10/18/2024 01:13 PM    HGB 15.6 10/18/2024 01:13 PM    HCT 44.8 10/18/2024 01:13 PM    MCV 84.2 10/18/2024 01:13 PM    RDW 12.8 10/18/2024 01:13 PM     10/18/2024 01:13 PM       CMP:   Lab Results   Component Value Date/Time     10/18/2024 01:13 PM    K 4.2 10/18/2024 01:13 PM     10/18/2024 01:13 PM    CO2 28 10/18/2024 01:13 PM    BUN 19 10/18/2024 01:13 PM    CREATININE 1.0 10/18/2024 01:13 PM    GFRAA >60 03/18/2022 12:31 PM    LABGLOM >90 10/18/2024 01:13 PM    LABGLOM >60 09/13/2023 04:53 PM    GLUCOSE 81 10/18/2024 01:13 PM    CALCIUM 9.1 10/18/2024 01:13 PM    BILITOT 1.2 09/13/2023 04:53 PM    ALKPHOS 88 09/13/2023 04:53 PM    AST 15 09/13/2023 04:53 PM    ALT 13 09/13/2023 04:53 PM       POC Tests: No results for input(s): \"POCGLU\",

## 2024-10-24 NOTE — ANESTHESIA POSTPROCEDURE EVALUATION
Department of Anesthesiology  Postprocedure Note    Patient: Alex Fry  MRN: [id number]  YOB: 1985  Date of evaluation: 10/24/2024    Procedure Summary       Date: 10/24/24 Room / Location: [address]    Anesthesia Start: 0820 Anesthesia Stop: 1005    Procedure: FOOT TENDON TRANSFER REPAIR-RIGHT ANTERIOR TALOFIBULAR LIGAMENT RECONSTRUCTION, OPEN REPAIR PERONEAL TENDON TEAR, OPEN REPAIR PERONEAL SUBLUXATION (Right) Diagnosis:       Tear of peroneal tendon of right foot      (Tear of peroneal tendon of right foot [S86.311A])    Surgeons: Jr Martel DPM Responsible Provider: Liberty Wheat APRN - IALYN    Anesthesia Type: general ASA Status: 1            Anesthesia Type: No value filed.    Claudine Phase I: Claudine Score: 10    Claudine Phase II: Claudine Score: 10    Anesthesia Post Evaluation    Patient location during evaluation: PACU  Patient participation: complete - patient participated  Level of consciousness: awake  Airway patency: patent  Nausea & Vomiting: no vomiting and no nausea  Cardiovascular status: blood pressure returned to baseline and hemodynamically stable  Respiratory status: acceptable, spontaneous ventilation and room air  Hydration status: stable  Multimodal analgesia pain management approach  Pain management: satisfactory to patient        No notable events documented.

## 2024-12-17 ENCOUNTER — HOSPITAL ENCOUNTER (OUTPATIENT)
Dept: PHYSICAL THERAPY | Age: 39
Setting detail: THERAPIES SERIES
Discharge: HOME | End: 2024-12-17
Payer: COMMERCIAL

## 2024-12-17 PROCEDURE — 97110 THERAPEUTIC EXERCISES: CPT

## 2024-12-17 PROCEDURE — 97161 PT EVAL LOW COMPLEX 20 MIN: CPT

## 2024-12-17 ASSESSMENT — PAIN SCALES - GENERAL: PAINLEVEL_OUTOF10: 2

## 2024-12-17 NOTE — PLAN OF CARE
[facility]       Phone: [phone]   Date: 2024                      Outpatient Physical Therapy  Fax: [phone]    [id number]                     Plan of Care  [facility]#: [id number]  Patient: Alex Fry  : 1985    Referring Provider (secondary): Jr Martel    Diagnosis: FOOT TENDON TRANSFER REPAIR-RIGHT ANTERIOR TALOFIBULAR LIGAMENT RECONSTRUCTION, OPEN REPAIR PERONEAL TENDON TEAR, OPEN REPAIR PERONEAL SUBLUXATION  Onset Date: 10/24/24  Treatment Diagnosis: Right ankle pain    Assessment  Body Structures, Functions, Activity Limitations Requiring Skilled Therapeutic Intervention: Decreased functional mobility , Decreased ADL status, Decreased ROM, Decreased body mechanics, Decreased tolerance to work activity, Decreased strength, Decreased posture, Increased pain  Assessment: Patient presents s/p right ATFL reconstruction and  peroneal tendon repair on 10/24/24.  ROM is quite limited as would be expected at this time.  Withheld strength and balance measurements until clarification from surgeon on restrictions.  Call placed this morning to surgeon to request protocol or any precautions/restrictions.  Will continue with ROM exercises and progress to strengthening and balance as allowed.  Therapy Prognosis: Excellent    Treatment Plan   Days: 3 times per week Weeks: 6 weeks Total # of Visits Approved: 18    Patient Education/HEP, Therapeutic Exercise, Manual Therapy: Myofacial Release/Cupping, Manual Therapy: Mobilization/Manipulation, Neuro Re-ed, Gait Training, HP/CP, Electrical Stimulation, and Therapeutic Activity     Goals  Short Term Goals  Time Frame for Short Term Goals: 6 visits  Short Term Goal 1: Patient will improve DF AROM to 5 deg to improve gait mechanics  Short Term Goal 2: Patient will improve right ankle MMT to 3+/5 in all directions to improve balance and gait  Short Term Goal 3: Patient will demonstrate compliance with HEP to optimize therapy

## 2024-12-17 NOTE — THERAPY EVALUATION
score from 32/80 to 60/80 to demonstrate functional improvement    Patient's Goal:  Patient Goals : Return to work    Timed Code Treatment Minutes: 10 Minutes  Total Treatment Time: 45     Time In: 0845  Time Out: 0935    Rupesh Concepcion, PT Date: 12/17/2024

## 2024-12-19 ENCOUNTER — HOSPITAL ENCOUNTER (OUTPATIENT)
Dept: PHYSICAL THERAPY | Age: 39
Setting detail: THERAPIES SERIES
Discharge: HOME | End: 2024-12-19
Payer: COMMERCIAL

## 2024-12-19 PROCEDURE — 97110 THERAPEUTIC EXERCISES: CPT

## 2024-12-19 ASSESSMENT — PAIN SCALES - GENERAL: PAINLEVEL_OUTOF10: 2

## 2024-12-19 NOTE — PROGRESS NOTES
Term Goal 3: Patient will improve SLS to 15\" on right to demonstrate good balance  Long Term Goal 4: Patient will improve LEFS score from 32/80 to 60/80 to demonstrate functional improvement    Post Treatment Pain:  2/10    Time In: 1300    Time Out : 1345        Timed Code Treatment Minutes: 45 Minutes  Total Treatment Time: 45 Minutes    Rupesh Concepcion, PT     Date: 12/19/2024

## 2024-12-24 ENCOUNTER — HOSPITAL ENCOUNTER (OUTPATIENT)
Dept: PHYSICAL THERAPY | Age: 39
Setting detail: THERAPIES SERIES
Discharge: HOME | End: 2024-12-24
Payer: COMMERCIAL

## 2024-12-24 PROCEDURE — 97110 THERAPEUTIC EXERCISES: CPT

## 2024-12-24 ASSESSMENT — PAIN SCALES - GENERAL: PAINLEVEL_OUTOF10: 5

## 2024-12-24 NOTE — PROGRESS NOTES
Phone: [phone]                 [facility]      Fax: [phone]                            Outpatient Physical Therapy                                                                            Daily Note    Date: 2024  Patient Name: Alex Fry        MRN: [id number]   ACCT#:  [id number]  : 1985  (39 y.o.)    Referring Provider (secondary): Jr Martel         Diagnosis: FOOT TENDON TRANSFER REPAIR-RIGHT ANTERIOR TALOFIBULAR LIGAMENT RECONSTRUCTION, OPEN REPAIR PERONEAL TENDON TEAR, OPEN REPAIR PERONEAL SUBLUXATION  Treatment Diagnosis: Right ankle pain    Onset Date: 10/24/24  PT Insurance Information: Aetna  Total # of Visits Approved: 18 Per Physician Order  Total # of Visits to Date: 3  Plan of Care/Certification Expiration Date: 25    Pre-Treatment Pain:  5/10     Assessment  Assessment: Patient states R ankle pain is a 5/10 this morning. Patient reports [place] to [place] last night and the extra walking has caused increased pain. Patient ambulates into session with one axillary crutch and ASO brace on R ankle. Continued with ankle ROM exercises and progressed strengthening ex with good tolerance from patient. R ankle PROM DF = 0 deg and AROM DF = 3 deg from neutral. Post session patient notes R ankle pain remains a 5/10.    Plan  Continue with current plan of care    Exercises/Modalities/Manual:  See DocFlow Sheet    Education: HEP     Goals  (Total # of Visits to Date: 3)   Short Term Goals  Time Frame for Short Term Goals: 6 visits  Short Term Goal 1: Patient will improve DF AROM to 5 deg to improve gait mechanics  Short Term Goal 2: Patient will improve right ankle MMT to 3+/5 in all directions to improve balance and gait  Short Term Goal 3: Patient will demonstrate compliance with HEP to optimize therapy progress    Long Term Goals  Time Frame for Long Term Goals : 18 visits  Long Term Goal 1: Patient will improve right ankle DF AROM to 10 deg to

## 2024-12-30 ENCOUNTER — HOSPITAL ENCOUNTER (OUTPATIENT)
Dept: PHYSICAL THERAPY | Age: 39
Setting detail: THERAPIES SERIES
Discharge: HOME | End: 2024-12-30
Payer: COMMERCIAL

## 2024-12-30 NOTE — PROGRESS NOTES
Physical Therapy  [facility]  Rehab and Wellness    Date: 2024  Patient Name: Alex Fry        : 1985       Patient called and cancelled his appointment not able to make it.  He will try for the next scheduled appointment.    Cee MYERS Shock Date: 2024

## 2025-01-02 ENCOUNTER — HOSPITAL ENCOUNTER (OUTPATIENT)
Dept: PHYSICAL THERAPY | Age: 40
Setting detail: THERAPIES SERIES
Discharge: HOME | End: 2025-01-02
Payer: COMMERCIAL

## 2025-01-02 PROCEDURE — 97110 THERAPEUTIC EXERCISES: CPT

## 2025-01-02 ASSESSMENT — PAIN SCALES - GENERAL: PAINLEVEL_OUTOF10: 3

## 2025-01-02 NOTE — PROGRESS NOTES
improve SLS to 15\" on right to demonstrate good balance  Long Term Goal 4: Patient will improve LEFS score from 32/80 to 60/80 to demonstrate functional improvement    Post Treatment Pain:  2/10    Time In: 1113    Time Out : 1200        Timed Code Treatment Minutes: 47 Minutes  Total Treatment Time: 47 Minutes    Rupesh Concepcion, PT     Date: 1/2/2025

## 2025-01-07 ENCOUNTER — HOSPITAL ENCOUNTER (OUTPATIENT)
Dept: PHYSICAL THERAPY | Age: 40
Setting detail: THERAPIES SERIES
Discharge: HOME | End: 2025-01-07
Payer: COMMERCIAL

## 2025-01-07 PROCEDURE — 97110 THERAPEUTIC EXERCISES: CPT

## 2025-01-07 PROCEDURE — 97140 MANUAL THERAPY 1/> REGIONS: CPT

## 2025-01-07 ASSESSMENT — PAIN SCALES - GENERAL: PAINLEVEL_OUTOF10: 1

## 2025-01-07 NOTE — PROGRESS NOTES
progress    Long Term Goals  Time Frame for Long Term Goals : 18 visits  Long Term Goal 1: Patient will improve right ankle DF AROM to 10 deg to normalize gait  Long Term Goal 2: Patient will improve right ankle MMT to 5/5 in all planes to improve functional abilities  Long Term Goal 3: Patient will improve SLS to 15\" on right to demonstrate good balance  Long Term Goal 4: Patient will improve LEFS score from 32/80 to 60/80 to demonstrate functional improvement    Post Treatment Pain:  1/10    Time In: 1502    Time Out : 1547   Timed Code Treatment Minutes: 45 Minutes  Total Treatment Time: 45 Minutes    Dallin Turk PTA     Date: 1/7/2025

## 2025-01-09 ENCOUNTER — HOSPITAL ENCOUNTER (OUTPATIENT)
Dept: PHYSICAL THERAPY | Age: 40
Setting detail: THERAPIES SERIES
Discharge: HOME | End: 2025-01-09
Payer: COMMERCIAL

## 2025-01-09 PROCEDURE — 97110 THERAPEUTIC EXERCISES: CPT

## 2025-01-09 PROCEDURE — 97140 MANUAL THERAPY 1/> REGIONS: CPT

## 2025-01-09 NOTE — PROGRESS NOTES
SLS to 15\" on right to demonstrate good balance  Long Term Goal 4: Patient will improve LEFS score from 32/80 to 60/80 to demonstrate functional improvement    Post Treatment Pain:  1/10    Time In: 1456    Time Out : 1543   Timed Coded Treatment Minutes: 44 Minutes        Total Treatment Time: 44 Minutes    Jennifer Cross ,PTA    Date: 1/9/2025

## 2025-01-10 ENCOUNTER — HOSPITAL ENCOUNTER (OUTPATIENT)
Dept: PHYSICAL THERAPY | Age: 40
Setting detail: THERAPIES SERIES
Discharge: HOME | End: 2025-01-10
Payer: COMMERCIAL

## 2025-01-10 PROCEDURE — 97110 THERAPEUTIC EXERCISES: CPT

## 2025-01-10 PROCEDURE — 97140 MANUAL THERAPY 1/> REGIONS: CPT

## 2025-01-10 ASSESSMENT — PAIN SCALES - GENERAL: PAINLEVEL_OUTOF10: 2

## 2025-01-10 NOTE — PROGRESS NOTES
Phone: [phone]                 [facility]      Fax: [phone]                            Outpatient Physical Therapy                                                                            Daily Note    Date: 1/10/2025  Patient Name: Alex Fry        MRN: [id number]   ACCT#:  [id number]  : 1985  (39 y.o.)    Referring Provider (secondary): Jr Martel         Diagnosis: FOOT TENDON TRANSFER REPAIR-RIGHT ANTERIOR TALOFIBULAR LIGAMENT RECONSTRUCTION, OPEN REPAIR PERONEAL TENDON TEAR, OPEN REPAIR PERONEAL SUBLUXATION  Treatment Diagnosis: Right ankle pain    Onset Date: 10/24/24  PT Insurance Information: Aetna  Total # of Visits Approved: 18 Per Physician Order  Total # of Visits to Date: 6  Plan of Care/Certification Expiration Date: 25    Pre-Treatment Pain:  2/10     Assessment  Assessment: Patient rates pain today as 2/10 primarily on the outside of his foot.  Completes exercises as documented above.  AROM R DF 5 degrees.  R ankle strength in all directinos is 3+/5.    Reports complinace with current HEP.  Conclude session with PROM/manual therapy to R ankle.    Plan  Continue with current plan of care    Exercises/Modalities/Manual:  See DocFlow Sheet    Education:           Goals  (Total # of Visits to Date: 6)   Short Term Goals  Time Frame for Short Term Goals: 6 visits  Short Term Goal 1: Patient will improve DF AROM to 5 deg to improve gait mechanics-MET  Short Term Goal 2: Patient will improve right ankle MMT to 3+/5 in all directions to improve balance and gait-MET  Short Term Goal 3: Patient will demonstrate compliance with HEP to optimize therapy progress-MET    Long Term Goals  Time Frame for Long Term Goals : 18 visits  Long Term Goal 1: Patient will improve right ankle DF AROM to 10 deg to normalize gait  Long Term Goal 2: Patient will improve right ankle MMT to 5/5 in all planes to improve functional abilities  Long Term Goal 3: Patient will improve SLS to

## 2025-01-14 ENCOUNTER — HOSPITAL ENCOUNTER (OUTPATIENT)
Dept: PHYSICAL THERAPY | Age: 40
Setting detail: THERAPIES SERIES
Discharge: HOME | End: 2025-01-14
Payer: COMMERCIAL

## 2025-01-14 NOTE — PROGRESS NOTES
Physical Therapy  [facility]  Rehab and Wellness    Date: 2025  Patient Name: Alex OSULLIVAN Derrickkarimemeenu        : 1985       Patient called and not able to make this appointment.  He will be here tomorrow.      Cee S Shock Date: 2025

## 2025-01-15 ENCOUNTER — HOSPITAL ENCOUNTER (OUTPATIENT)
Dept: PHYSICAL THERAPY | Age: 40
Setting detail: THERAPIES SERIES
Discharge: HOME | End: 2025-01-15
Payer: COMMERCIAL

## 2025-01-15 PROCEDURE — 97110 THERAPEUTIC EXERCISES: CPT

## 2025-01-15 PROCEDURE — 97140 MANUAL THERAPY 1/> REGIONS: CPT

## 2025-01-15 ASSESSMENT — PAIN SCALES - GENERAL: PAINLEVEL_OUTOF10: 2

## 2025-01-15 NOTE — PROGRESS NOTES
Phone: [phone]                 [facility]      Fax: [phone]                            Outpatient Physical Therapy                                                                            Daily Note    Date: 1/15/2025  Patient Name: Alex Fry        MRN: [id number]   ACCT#:  [id number]  : 1985  (39 y.o.)    Referring Provider (secondary): Jr Martel         Diagnosis: FOOT TENDON TRANSFER REPAIR-RIGHT ANTERIOR TALOFIBULAR LIGAMENT RECONSTRUCTION, OPEN REPAIR PERONEAL TENDON TEAR, OPEN REPAIR PERONEAL SUBLUXATION  Treatment Diagnosis: Right ankle pain    Onset Date: 10/24/24  PT Insurance Information: Aetna  Total # of Visits Approved: 18 Per Physician Order  Total # of Visits to Date: 8  Plan of Care/Certification Expiration Date: 25    Pre-Treatment Pain:  2/10     Assessment  Assessment: Patient states R ankle pain is a 2/10 this afternoon. Patient notes ankle has bee sore over the last couple days because he has been on his feet a little more. Added single leg balance ex with good tolerance from patient. Concluded session with manual to improve ankle joint mobility. Upon removing ankle brace patient does have a decent amount of swelling at lateral malleolus. R ankle PROM DF = 10 deg and AROM DF = 5 deg.    Plan  Continue with current plan of care    Exercises/Modalities/Manual:  See DocFlow Sheet    Education: HEP       Goals  (Total # of Visits to Date: 8)   Short Term Goals  Time Frame for Short Term Goals: 6 visits  Short Term Goal 1: Patient will improve DF AROM to 5 deg to improve gait mechanics-MET  Short Term Goal 2: Patient will improve right ankle MMT to 3+/5 in all directions to improve balance and gait-MET  Short Term Goal 3: Patient will demonstrate compliance with HEP to optimize therapy progress-MET    Long Term Goals  Time Frame for Long Term Goals : 18 visits  Long Term Goal 1: Patient will improve right ankle DF AROM to 10 deg to normalize gait  Long

## 2025-01-15 NOTE — PROGRESS NOTES
Phone: [phone]                  [facility] [place]            Fax: [phone]                             Outpatient Physical Therapy                                                                      Progress Report    Date: 1/15/2025   MRN: [id number]    [id number]  Patient: Alex Fry  : 1985  Referring Provider (secondary): Jr Martel         Diagnosis: FOOT TENDON TRANSFER REPAIR-RIGHT ANTERIOR TALOFIBULAR LIGAMENT RECONSTRUCTION, OPEN REPAIR PERONEAL TENDON TEAR, OPEN REPAIR PERONEAL SUBLUXATION      Treatment Diagnosis: Right ankle pain    Onset Date: 10/24/24  PT Insurance Information: [employer]  Total # of Visits Approved: 18 Per Physician Order  Total # of Visits to Date: 8    Assessment  Patient has been compliant with therapy appointments and HEP. Patient is able to ambulate without device. Depending on activity level patient will still have a little bit of a limp and some mild ankle swelling. R ankle PROM DF = 10 deg and AROM DF = 5 deg. R ankle MMT is 3+/5 in all planes. Will continue to progress LE strength and ankle stability.    Therapy Prognosis: Excellent    Plan  Continue with current plan of care    Goals  Short Term Goals  Time Frame for Short Term Goals: 6 visits  Short Term Goal 1: Patient will improve DF AROM to 5 deg to improve gait mechanics-MET  Short Term Goal 2: Patient will improve right ankle MMT to 3+/5 in all directions to improve balance and gait-MET  Short Term Goal 3: Patient will demonstrate compliance with HEP to optimize therapy progress-MET    Long Term Goals  Time Frame for Long Term Goals : 18 visits  Long Term Goal 1: Patient will improve right ankle DF AROM to 10 deg to normalize gait  Long Term Goal 2: Patient will improve right ankle MMT to 5/5 in all planes to improve functional abilities  Long Term Goal 3: Patient will improve SLS to 15\" on right to demonstrate good balance  Long Term Goal 4: Patient will improve LEFS score from 32/80

## 2025-01-21 ENCOUNTER — HOSPITAL ENCOUNTER (OUTPATIENT)
Dept: PHYSICAL THERAPY | Age: 40
Setting detail: THERAPIES SERIES
Discharge: HOME | End: 2025-01-21
Payer: COMMERCIAL

## 2025-01-21 PROCEDURE — 97110 THERAPEUTIC EXERCISES: CPT

## 2025-01-21 NOTE — PROGRESS NOTES
Phone: [phone]                 [facility]      Fax: [phone]                            Outpatient Physical Therapy                                                                            Daily Note    Date: 2025  Patient Name: Alex Fry        MRN: [id number]   ACCT#:  [id number]  : 1985  (39 y.o.)    Referring Provider (secondary): Jr Martel         Diagnosis: FOOT TENDON TRANSFER REPAIR-RIGHT ANTERIOR TALOFIBULAR LIGAMENT RECONSTRUCTION, OPEN REPAIR PERONEAL TENDON TEAR, OPEN REPAIR PERONEAL SUBLUXATION  Treatment Diagnosis: Right ankle pain    Onset Date: 10/24/24  PT Insurance Information: [employer]  Total # of Visits Approved: 18 Per Physician Order  Total # of Visits to Date: 9  Plan of Care/Certification Expiration Date: 25    Pre-Treatment Pain:  0/10     Assessment  Assessment: Patient denies R ankle pain this morning. Patient reports following up with surgeon last week and he is pleased with how patient is progressing. Continued with LE and ankle strengthening ex per flowsheet. R ankle PROM DF = 11 deg and AROM DF = 6 deg. Patient scheduled one appointment for next week, but wants to check schedule before scheduling another at end of next week.    Plan  Continue with current plan of care    Exercises/Modalities/Manual:  See DocFlow Sheet    Education: HEP    Goals  (Total # of Visits to Date: 9)   Short Term Goals  Time Frame for Short Term Goals: 6 visits  Short Term Goal 1: Patient will improve DF AROM to 5 deg to improve gait mechanics-MET  Short Term Goal 2: Patient will improve right ankle MMT to 3+/5 in all directions to improve balance and gait-MET  Short Term Goal 3: Patient will demonstrate compliance with HEP to optimize therapy progress-MET    Long Term Goals  Time Frame for Long Term Goals : 18 visits  Long Term Goal 1: Patient will improve right ankle DF AROM to 10 deg to normalize gait  Long Term Goal 2: Patient will improve right ankle MMT to

## 2025-01-23 ENCOUNTER — HOSPITAL ENCOUNTER (OUTPATIENT)
Dept: PHYSICAL THERAPY | Age: 40
Setting detail: THERAPIES SERIES
Discharge: HOME | End: 2025-01-23
Payer: COMMERCIAL

## 2025-01-23 PROCEDURE — 97110 THERAPEUTIC EXERCISES: CPT

## 2025-01-23 NOTE — PROGRESS NOTES
planes to improve functional abilities  Long Term Goal 3: Patient will improve SLS to 15\" on right to demonstrate good balance  Long Term Goal 4: Patient will improve LEFS score from 32/80 to 60/80 to demonstrate functional improvement    Treatment Tolerance:  Treatment Tolerance: Tolerated treatment well.    Post Treatment Pain:  0/10    Time In: 1520     Time Out : 1600        Timed Code Treatment Minutes: 40 Minutes  Total Treatment Time: 40 Minutes    Rupesh Concepcion, PT     Date: 1/23/2025

## 2025-01-28 ENCOUNTER — HOSPITAL ENCOUNTER (OUTPATIENT)
Dept: PHYSICAL THERAPY | Age: 40
Setting detail: THERAPIES SERIES
Discharge: HOME | End: 2025-01-28
Payer: COMMERCIAL

## 2025-01-28 PROCEDURE — 97110 THERAPEUTIC EXERCISES: CPT

## 2025-01-28 NOTE — PROGRESS NOTES
Phone: [phone]                 [facility]      Fax: [phone]                            Outpatient Physical Therapy                                                                            Daily Note    Date: 2025  Patient Name: Alex Fry        MRN: [id number]   ACCT#:  [id number]  : 1985  (39 y.o.)    Referring Provider (secondary): Jr Martel         Diagnosis: FOOT TENDON TRANSFER REPAIR-RIGHT ANTERIOR TALOFIBULAR LIGAMENT RECONSTRUCTION, OPEN REPAIR PERONEAL TENDON TEAR, OPEN REPAIR PERONEAL SUBLUXATION  Treatment Diagnosis: Right ankle pain    Onset Date: 10/24/24  PT Insurance Information: [employer]  Total # of Visits Approved: 18 Per Physician Order  Total # of Visits to Date: 10  Plan of Care/Certification Expiration Date: 25    Pre-Treatment Pain:  0/10  Subjective: 1512:  Denies pain today.  Reports jogging a little on Saturday without pain.  Notes some cramping in calf.  Has to leave by 1545 to get kids from school.  Assessment  Assessment: Abbreviated session due to patient time restraint.  Focused on balance and stabilization.  Mild soreness noted afterward.    Plan  Continue with current plan of care    Exercises/Modalities/Manual:  See DocFlow Sheet    Education: Education on progression of exercises            Goals  (Total # of Visits to Date: 10)   Short Term Goals  Time Frame for Short Term Goals: 6 visits  Short Term Goal 1: Patient will improve DF AROM to 5 deg to improve gait mechanics-MET  Short Term Goal 2: Patient will improve right ankle MMT to 3+/5 in all directions to improve balance and gait-MET  Short Term Goal 3: Patient will demonstrate compliance with HEP to optimize therapy progress-MET    Long Term Goals  Time Frame for Long Term Goals : 18 visits  Long Term Goal 1: Patient will improve right ankle DF AROM to 10 deg to normalize gait  Long Term Goal 2: Patient will improve right ankle MMT to 5/5 in all planes to improve functional

## 2025-01-30 ENCOUNTER — HOSPITAL ENCOUNTER (OUTPATIENT)
Dept: PHYSICAL THERAPY | Age: 40
Setting detail: THERAPIES SERIES
Discharge: HOME | End: 2025-01-30
Payer: COMMERCIAL

## 2025-01-30 NOTE — PROGRESS NOTES
Physical Therapy  [facility]  Rehab and Wellness    Date: 2025  Patient Name: Alex Fry        : 1985       Patient has a sick child and no place to take them to come to therapy.  He is cancelling and will return on the next appt.      Cee S Shock Date: 2025

## 2025-02-04 ENCOUNTER — HOSPITAL ENCOUNTER (OUTPATIENT)
Dept: PHYSICAL THERAPY | Age: 40
Setting detail: THERAPIES SERIES
Discharge: HOME | End: 2025-02-04
Payer: COMMERCIAL

## 2025-02-04 NOTE — PROGRESS NOTES
Phone: [phone]                 [facility]      Fax: [phone]                            Outpatient Physical Therapy                                                                            Daily Note    Date: 2025  Patient Name: Alex Fry        MRN: [id number]   ACCT#:  [id number]  : 1985  (39 y.o.)                           Per Physician Order  No Show: 1    Subjective: No call, no show.  This PT left voicemail with patient to remind of next appointment      Rupesh Concepcion, PT     Date: 2025

## 2025-02-05 ENCOUNTER — HOSPITAL ENCOUNTER (OUTPATIENT)
Dept: PHYSICAL THERAPY | Age: 40
Setting detail: THERAPIES SERIES
Discharge: HOME | End: 2025-02-05
Payer: COMMERCIAL

## 2025-02-05 PROCEDURE — 97110 THERAPEUTIC EXERCISES: CPT

## 2025-02-05 NOTE — PROGRESS NOTES
Phone: [phone]                 [facility]      Fax: [phone]                            Outpatient Physical Therapy                                                                            Daily Note    Date: 2025  Patient Name: Alex Fry        MRN: [id number]   ACCT#:  [id number]  : 1985  (39 y.o.)    Referring Provider (secondary): Jr Martel         Diagnosis: FOOT TENDON TRANSFER REPAIR-RIGHT ANTERIOR TALOFIBULAR LIGAMENT RECONSTRUCTION, OPEN REPAIR PERONEAL TENDON TEAR, OPEN REPAIR PERONEAL SUBLUXATION  Treatment Diagnosis: Right ankle pain    Onset Date: 10/24/24  PT Insurance Information: [employer]  Total # of Visits Approved: 18 Per Physician Order  Total # of Visits to Date: 10  Plan of Care/Certification Expiration Date: 25    Pre-Treatment Pain:  0/10  Subjective: 1645:  Pain continues to improve.  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  Walking around on uneven surfaces with brace and tall boot.  Was still sore.  Assessment  Assessment: Did well with increased difficulty for balance work.  Still challenged with SLS activities though.    Plan  Continue with current plan of care    Exercises/Modalities/Manual:  See DocFlow Sheet    Education: Education on progression of exercises            Goals  (Total # of Visits to Date: 10)   Short Term Goals  Time Frame for Short Term Goals: 6 visits  Short Term Goal 1: Patient will improve DF AROM to 5 deg to improve gait mechanics-MET  Short Term Goal 2: Patient will improve right ankle MMT to 3+/5 in all directions to improve balance and gait-MET  Short Term Goal 3: Patient will demonstrate compliance with HEP to optimize therapy progress-MET    Long Term Goals  Time Frame for Long Term Goals : 18 visits  Long Term Goal 1: Patient will improve right ankle DF AROM to 10 deg to normalize gait  Long Term Goal 2: Patient will improve right ankle MMT to 5/5 in all planes to improve functional abilities  Long Term Goal 3:

## 2025-02-07 ENCOUNTER — HOSPITAL ENCOUNTER (OUTPATIENT)
Dept: PHYSICAL THERAPY | Age: 40
Setting detail: THERAPIES SERIES
Discharge: HOME | End: 2025-02-07
Payer: COMMERCIAL

## 2025-02-07 PROCEDURE — 97110 THERAPEUTIC EXERCISES: CPT

## 2025-02-07 NOTE — PROGRESS NOTES
improvement    Treatment Tolerance:  Treatment Tolerance: Tolerated treatment well.    Post Treatment Pain:  0/10    Time In: 1417  Time Out: 1457  Timed Code Treatment Minutes: 40 Minutes  Total Treatment Time: 40 Minutes    Dmitry Licona PTA     Date: 2/7/2025

## 2025-02-11 ENCOUNTER — HOSPITAL ENCOUNTER (OUTPATIENT)
Dept: PHYSICAL THERAPY | Age: 40
Setting detail: THERAPIES SERIES
Discharge: HOME | End: 2025-02-11
Payer: COMMERCIAL

## 2025-02-11 PROCEDURE — 97110 THERAPEUTIC EXERCISES: CPT

## 2025-02-11 NOTE — PROGRESS NOTES
15\" on right to demonstrate good balance  Long Term Goal 4: Patient will improve LEFS score from 32/80 to 60/80 to demonstrate functional improvement    Treatment Tolerance:  Treatment Tolerance: Tolerated treatment well.    Post Treatment Pain:  0/10    Time In: 1648    Time Out : 1728   Timed Code Treatment Minutes: 40 Minutes  Total Treatment Time: 40 Minutes    Dallin Turk PTA     Date: 2/11/2025

## 2025-02-13 ENCOUNTER — HOSPITAL ENCOUNTER (OUTPATIENT)
Dept: PHYSICAL THERAPY | Age: 40
Setting detail: THERAPIES SERIES
Discharge: HOME | End: 2025-02-13
Payer: COMMERCIAL

## 2025-02-13 PROCEDURE — 97110 THERAPEUTIC EXERCISES: CPT

## 2025-02-13 NOTE — PROGRESS NOTES
Phone: [phone]                 [facility]      Fax: [phone]                            Outpatient Physical Therapy                                                                            Daily Note    Date: 2025  Patient Name: Alex Fry        MRN: [id number]   ACCT#:  [id number]  : 1985  (39 y.o.)    Referring Provider (secondary): Jr Martel         Diagnosis: FOOT TENDON TRANSFER REPAIR-RIGHT ANTERIOR TALOFIBULAR LIGAMENT RECONSTRUCTION, OPEN REPAIR PERONEAL TENDON TEAR, OPEN REPAIR PERONEAL SUBLUXATION  Treatment Diagnosis: Right ankle pain    Onset Date: 10/24/24  PT Insurance Information: [employer]na  Total # of Visits Approved: 18 Per Physician Order  Total # of Visits to Date: 13  No Show: 1  Plan of Care/Certification Expiration Date: 25    Pre-Treatment Pain:  2/10     Assessment  Assessment: Patient states R ankle pain is a 2/10 this afternoon. Patient continues to note some ankle instability with certain activities. Continued with LE strengthening and ankle stability exercises per flowsheet. Session ended earlier as patient had to go  his kids at school. Plan to follow up with surgeon next Thursday ().    Plan  Continue with current plan of care    Exercises/Modalities/Manual:  See DocFlow Sheet    Education: HEP    Goals  (Total # of Visits to Date: 13)   Short Term Goals  Time Frame for Short Term Goals: 6 visits  Short Term Goal 1: Patient will improve DF AROM to 5 deg to improve gait mechanics-MET  Short Term Goal 2: Patient will improve right ankle MMT to 3+/5 in all directions to improve balance and gait-MET  Short Term Goal 3: Patient will demonstrate compliance with HEP to optimize therapy progress-MET    Long Term Goals  Time Frame for Long Term Goals : 18 visits  Long Term Goal 1: Patient will improve right ankle DF AROM to 10 deg to normalize gait  Long Term Goal 2: Patient will improve right ankle MMT to 5/5 in all planes to improve

## 2025-02-18 ENCOUNTER — HOSPITAL ENCOUNTER (OUTPATIENT)
Dept: PHYSICAL THERAPY | Age: 40
Setting detail: THERAPIES SERIES
Discharge: HOME | End: 2025-02-18
Payer: COMMERCIAL

## 2025-02-18 NOTE — PROGRESS NOTES
Physical Therapy  [facility]  Rehab and Wellness    Date: 2025  Patient Name: Alex Fry        : 1985       Patient called and not able to make this appointment.      Cee MYERS Shock Date: 2025

## 2025-02-21 ENCOUNTER — APPOINTMENT (OUTPATIENT)
Dept: PHYSICAL THERAPY | Age: 40
End: 2025-02-21
Payer: COMMERCIAL

## 2025-04-01 ENCOUNTER — OFFICE VISIT (OUTPATIENT)
Dept: FAMILY MEDICINE CLINIC | Age: 40
End: 2025-04-01
Payer: COMMERCIAL

## 2025-04-01 VITALS
SYSTOLIC BLOOD PRESSURE: 110 MMHG | DIASTOLIC BLOOD PRESSURE: 78 MMHG | OXYGEN SATURATION: 95 % | HEIGHT: 70 IN | WEIGHT: 250 LBS | HEART RATE: 74 BPM | BODY MASS INDEX: 35.79 KG/M2

## 2025-04-01 DIAGNOSIS — R05.1 ACUTE COUGH: ICD-10-CM

## 2025-04-01 DIAGNOSIS — B34.9 VIRAL ILLNESS: Primary | ICD-10-CM

## 2025-04-01 PROCEDURE — 99213 OFFICE O/P EST LOW 20 MIN: CPT | Performed by: FAMILY MEDICINE

## 2025-04-01 RX ORDER — AZITHROMYCIN 250 MG/1
TABLET, FILM COATED ORAL
COMMUNITY
Start: 2025-03-29 | End: 2025-04-02

## 2025-04-01 RX ORDER — ALBUTEROL SULFATE 90 UG/1
2 INHALANT RESPIRATORY (INHALATION) EVERY 4 HOURS PRN
Qty: 18 G | Refills: 0 | Status: SHIPPED | OUTPATIENT
Start: 2025-04-01

## 2025-04-01 RX ORDER — BENZONATATE 100 MG/1
100 CAPSULE ORAL 3 TIMES DAILY PRN
Qty: 30 CAPSULE | Refills: 0 | Status: SHIPPED | OUTPATIENT
Start: 2025-04-01

## 2025-04-01 RX ORDER — ONDANSETRON 4 MG/1
4 TABLET, ORALLY DISINTEGRATING ORAL EVERY 8 HOURS PRN
COMMUNITY
Start: 2025-03-29

## 2025-04-01 SDOH — ECONOMIC STABILITY: FOOD INSECURITY: WITHIN THE PAST 12 MONTHS, THE FOOD YOU BOUGHT JUST DIDN'T LAST AND YOU DIDN'T HAVE MONEY TO GET MORE.: NEVER TRUE

## 2025-04-01 SDOH — ECONOMIC STABILITY: FOOD INSECURITY: WITHIN THE PAST 12 MONTHS, YOU WORRIED THAT YOUR FOOD WOULD RUN OUT BEFORE YOU GOT MONEY TO BUY MORE.: NEVER TRUE

## 2025-04-01 ASSESSMENT — PATIENT HEALTH QUESTIONNAIRE - PHQ9
1. LITTLE INTEREST OR PLEASURE IN DOING THINGS: NOT AT ALL
SUM OF ALL RESPONSES TO PHQ QUESTIONS 1-9: 0
2. FEELING DOWN, DEPRESSED OR HOPELESS: NOT AT ALL
SUM OF ALL RESPONSES TO PHQ QUESTIONS 1-9: 0

## 2025-04-01 NOTE — PROGRESS NOTES
Name: Alex Fry  : 1985         Chief Complaint:     Chief Complaint   Patient presents with    Sinusitis     Coughing, sinus pressure, was vomiting on Saturday. Urgent care gave him zpack.        History of Present Illness:      Alex Fry is a 39 y.o.  male who presents with Sinusitis (Coughing, sinus pressure, was vomiting on Saturday. Urgent care gave him zpack. )      HPI    Patient presents complaining of cough.  He initially became sick 3/28 which was Friday night, vomited several times over the course of a few hours.  The next day he was still feeling poorly with some nausea, malaise, and developed a cough.  He was seen in the urgent care that day, had negative flu testing, as did his son who had similar symptoms.  Patient was placed on a Z-Percy which he has been taking.  His cough has worsened, especially with being outside in cold air.  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  He has taken some DayQuil which helps while he is in the house.  Not feeling short of breath.  No history of asthma or recurrent bronchitis.    Multiple family members are sick with similar symptoms.  They had been sick around the end of December also and had influenza A at that time.    Medical History:     Patient Active Problem List   Diagnosis    Paresthesia of left leg       Medications:       Prior to Admission medications    Medication Sig Start Date End Date Taking? Authorizing Provider   azithromycin (ZITHROMAX) 250 MG tablet Take by mouth 2 tablets (500 mg) on Day 1, then 1 tablet (250 mg) daily on Days 2-5 3/29/25 4/2/25 Yes Provider, MD Adelfo   ondansetron (ZOFRAN-ODT) 4 MG disintegrating tablet Take 1 tablet by mouth every 8 hours as needed 3/29/25  Yes Provider, Adelfo, MD   benzonatate (TESSALON) 100 MG capsule Take 1 capsule by mouth 3 times daily as needed for Cough 25  Yes Radha Farrar DO   albuterol sulfate HFA

## 2025-04-01 NOTE — PATIENT INSTRUCTIONS
[employer] SURVEY:    You may be receiving a survey from [employer] regarding your visit today.    You may get this in the mail, through your MyChart or in your email.     Please complete the survey to enable us to provide the highest quality of care to you and your family.    If you cannot score us as very good ( 5 Stars) on any question, please feel free to call the office to discuss how we could have made your experience exceptional.     Thank you.    Clinical Care Team:   DO Desean York CMA                                     Triage: Elaina Stark CMA              Clerical Team:    Elaina Lind     [place] [phone]           Billing questions: [phone]           Medical Records Request: [phone]

## 2025-04-11 ENCOUNTER — OFFICE VISIT (OUTPATIENT)
Dept: FAMILY MEDICINE CLINIC | Age: 40
End: 2025-04-11
Payer: COMMERCIAL

## 2025-04-11 ENCOUNTER — TELEPHONE (OUTPATIENT)
Dept: FAMILY MEDICINE CLINIC | Age: 40
End: 2025-04-11

## 2025-04-11 VITALS
DIASTOLIC BLOOD PRESSURE: 80 MMHG | TEMPERATURE: 97.9 F | HEIGHT: 70 IN | SYSTOLIC BLOOD PRESSURE: 128 MMHG | OXYGEN SATURATION: 98 % | WEIGHT: 251.2 LBS | BODY MASS INDEX: 35.96 KG/M2 | HEART RATE: 82 BPM

## 2025-04-11 DIAGNOSIS — Z13.220 SCREENING FOR HYPERLIPIDEMIA: ICD-10-CM

## 2025-04-11 DIAGNOSIS — Z13.1 ENCOUNTER FOR SCREENING EXAMINATION FOR IMPAIRED GLUCOSE REGULATION AND DIABETES MELLITUS: ICD-10-CM

## 2025-04-11 DIAGNOSIS — R05.8 POST-VIRAL COUGH SYNDROME: Primary | ICD-10-CM

## 2025-04-11 DIAGNOSIS — Z13.0 SCREENING FOR DEFICIENCY ANEMIA: ICD-10-CM

## 2025-04-11 DIAGNOSIS — Z00.00 ANNUAL PHYSICAL EXAM: Primary | ICD-10-CM

## 2025-04-11 PROCEDURE — 99213 OFFICE O/P EST LOW 20 MIN: CPT | Performed by: NURSE PRACTITIONER

## 2025-04-11 RX ORDER — DOXYCYCLINE 100 MG/1
100 CAPSULE ORAL 2 TIMES DAILY
COMMUNITY
Start: 2025-04-04 | End: 2025-04-14

## 2025-04-11 RX ORDER — PREDNISONE 20 MG/1
TABLET ORAL
COMMUNITY
Start: 2025-04-04

## 2025-04-11 ASSESSMENT — ENCOUNTER SYMPTOMS
DIARRHEA: 0
FACIAL SWELLING: 0
VOMITING: 0
SHORTNESS OF BREATH: 0
COUGH: 0
NAUSEA: 0

## 2025-04-11 NOTE — PROGRESS NOTES
HPI Notes    Name: Alex Fry  : 1985         Chief Complaint:     Chief Complaint   Patient presents with    Congestion     Patient presents for ongoing congestion. Complains of facial pressure. Runny nose, coughing, headaches. States mucus from blowing nose is green. Complains of SOB when walking - this is new.        History of Present Illness:        HPI  Patient is a 39-year-old male who reports for recurring illness.  Patient reports 2 weeks ago (3/29) he began having feverish, vomiting, fatigue, and myalgias. Patient was seen by a physician. He was treated with azithromycin and Zofran.  Patient did not improve and was seen again at a walk-in clinic on 2025.  At that time he was started on doxycycline and prednisone.  Patient endorses cough, body aches, congestion.  States he has some pressure and tenderness behind the left eye.  Overall, he is feeling better but would like to get rid of the pressure in his head.  Past Medical History:     History reviewed. No pertinent past medical history.   Reviewed all health maintenance requirements and ordered appropriate tests  Health Maintenance Due   Topic Date Due    Varicella vaccine (1 of 2 - 13+ 2-dose series) Never done    HIV screen  Never done    Hepatitis B vaccine (1 of 3 - 19+ 3-dose series) Never done    COVID-19 Vaccine (2024- season) Never done       Past Surgical History:     Past Surgical History:   Procedure Laterality Date    FOOT SURGERY Right 10/24/2024    FOOT TENDON TRANSFER REPAIR-RIGHT ANTERIOR TALOFIBULAR LIGAMENT RECONSTRUCTION, OPEN REPAIR PERONEAL TENDON TEAR, OPEN REPAIR PERONEAL SUBLUXATION performed by Jr Martel DPM at [facility] OR    WRIST SURGERY Right         Medications:       Prior to Admission medications    Medication Sig Start Date End Date Taking? Authorizing Provider   ondansetron (ZOFRAN-ODT) 4 MG disintegrating tablet Take 1 tablet by mouth every 8 hours as needed 3/29/25  Yes Provider,

## 2025-04-14 ENCOUNTER — HOSPITAL ENCOUNTER (OUTPATIENT)
Age: 40
Discharge: HOME | End: 2025-04-14
Payer: COMMERCIAL

## 2025-04-14 ENCOUNTER — RESULTS FOLLOW-UP (OUTPATIENT)
Dept: FAMILY MEDICINE CLINIC | Age: 40
End: 2025-04-14

## 2025-04-14 DIAGNOSIS — Z00.00 ANNUAL PHYSICAL EXAM: ICD-10-CM

## 2025-04-14 DIAGNOSIS — Z13.0 SCREENING FOR DEFICIENCY ANEMIA: ICD-10-CM

## 2025-04-14 DIAGNOSIS — Z13.1 ENCOUNTER FOR SCREENING EXAMINATION FOR IMPAIRED GLUCOSE REGULATION AND DIABETES MELLITUS: ICD-10-CM

## 2025-04-14 DIAGNOSIS — Z13.220 SCREENING FOR HYPERLIPIDEMIA: ICD-10-CM

## 2025-04-14 LAB
25(OH)D3 SERPL-MCNC: 41.9 NG/ML (ref 30–100)
ALBUMIN SERPL-MCNC: 4 G/DL (ref 3.5–5.2)
ALBUMIN/GLOB SERPL: 1.7 {RATIO} (ref 1–2.5)
ALP SERPL-CCNC: 89 U/L (ref 40–129)
ALT SERPL-CCNC: 16 U/L (ref 5–41)
ANION GAP SERPL CALCULATED.3IONS-SCNC: 10 MMOL/L (ref 9–17)
AST SERPL-CCNC: 20 U/L
BASOPHILS # BLD: 0.03 K/UL (ref 0–0.2)
BASOPHILS NFR BLD: 1 % (ref 0–2)
BILIRUB SERPL-MCNC: 0.7 MG/DL (ref 0.3–1.2)
BUN SERPL-MCNC: 15 MG/DL (ref 6–20)
CALCIUM SERPL-MCNC: 9 MG/DL (ref 8.6–10.4)
CHLORIDE SERPL-SCNC: 102 MMOL/L (ref 98–107)
CHOLEST SERPL-MCNC: 198 MG/DL (ref 0–199)
CHOLESTEROL/HDL RATIO: 5
CO2 SERPL-SCNC: 27 MMOL/L (ref 20–31)
CREAT SERPL-MCNC: 1 MG/DL (ref 0.7–1.2)
EOSINOPHIL # BLD: 0.15 K/UL (ref 0–0.4)
EOSINOPHILS RELATIVE PERCENT: 2 % (ref 0–5)
ERYTHROCYTE [DISTWIDTH] IN BLOOD BY AUTOMATED COUNT: 12.8 % (ref 12.1–15.2)
GFR, ESTIMATED: >90 ML/MIN/1.73M2
GLUCOSE SERPL-MCNC: 98 MG/DL (ref 70–99)
HCT VFR BLD AUTO: 42.4 % (ref 41–53)
HDLC SERPL-MCNC: 40 MG/DL
HGB BLD-MCNC: 14.5 G/DL (ref 13.5–17.5)
IMM GRANULOCYTES # BLD AUTO: 0.02 K/UL (ref 0–0.3)
IMM GRANULOCYTES NFR BLD: 0 % (ref 0–5)
LDLC SERPL CALC-MCNC: 144 MG/DL (ref 0–100)
LYMPHOCYTES NFR BLD: 1.72 K/UL (ref 1–4.8)
LYMPHOCYTES RELATIVE PERCENT: 26 % (ref 13–44)
MCH RBC QN AUTO: 28.4 PG (ref 26–34)
MCHC RBC AUTO-ENTMCNC: 34.2 G/DL (ref 31–37)
MCV RBC AUTO: 83 FL (ref 80–100)
MONOCYTES NFR BLD: 0.63 K/UL (ref 0–1)
MONOCYTES NFR BLD: 10 % (ref 5–9)
NEUTROPHILS NFR BLD: 61 % (ref 39–75)
NEUTS SEG NFR BLD: 4.01 K/UL (ref 2.1–6.5)
PLATELET # BLD AUTO: 258 K/UL (ref 140–450)
PMV BLD AUTO: 9.6 FL (ref 6–12)
POTASSIUM SERPL-SCNC: 4 MMOL/L (ref 3.7–5.3)
PROT SERPL-MCNC: 6.4 G/DL (ref 6.4–8.3)
RBC # BLD AUTO: 5.11 M/UL (ref 4.5–5.9)
SODIUM SERPL-SCNC: 139 MMOL/L (ref 135–144)
TRIGL SERPL-MCNC: 72 MG/DL
VLDLC SERPL CALC-MCNC: 14 MG/DL (ref 1–30)
WBC OTHER # BLD: 6.6 K/UL (ref 3.5–11)

## 2025-04-14 PROCEDURE — 80053 COMPREHEN METABOLIC PANEL: CPT

## 2025-04-14 PROCEDURE — 36415 COLL VENOUS BLD VENIPUNCTURE: CPT

## 2025-04-14 PROCEDURE — 82306 VITAMIN D 25 HYDROXY: CPT

## 2025-04-14 PROCEDURE — 80061 LIPID PANEL: CPT

## 2025-04-14 PROCEDURE — 85025 COMPLETE CBC W/AUTO DIFF WBC: CPT

## 2025-04-15 ENCOUNTER — OFFICE VISIT (OUTPATIENT)
Dept: FAMILY MEDICINE CLINIC | Age: 40
End: 2025-04-15
Payer: COMMERCIAL

## 2025-04-15 VITALS
WEIGHT: 253 LBS | BODY MASS INDEX: 36.22 KG/M2 | DIASTOLIC BLOOD PRESSURE: 80 MMHG | OXYGEN SATURATION: 97 % | HEART RATE: 62 BPM | HEIGHT: 70 IN | SYSTOLIC BLOOD PRESSURE: 130 MMHG

## 2025-04-15 DIAGNOSIS — Z80.0 FAMILY HISTORY OF COLON CANCER: ICD-10-CM

## 2025-04-15 DIAGNOSIS — Z12.11 SCREENING FOR MALIGNANT NEOPLASM OF COLON: ICD-10-CM

## 2025-04-15 DIAGNOSIS — Z00.00 ANNUAL PHYSICAL EXAM: Primary | ICD-10-CM

## 2025-04-15 DIAGNOSIS — Z83.719 FAMILY HISTORY OF COLONIC POLYPS: ICD-10-CM

## 2025-04-15 PROCEDURE — 99396 PREV VISIT EST AGE 40-64: CPT | Performed by: NURSE PRACTITIONER

## 2025-04-15 ASSESSMENT — PATIENT HEALTH QUESTIONNAIRE - PHQ9
SUM OF ALL RESPONSES TO PHQ QUESTIONS 1-9: 0
SUM OF ALL RESPONSES TO PHQ QUESTIONS 1-9: 0
2. FEELING DOWN, DEPRESSED OR HOPELESS: NOT AT ALL
SUM OF ALL RESPONSES TO PHQ QUESTIONS 1-9: 0
1. LITTLE INTEREST OR PLEASURE IN DOING THINGS: NOT AT ALL
SUM OF ALL RESPONSES TO PHQ QUESTIONS 1-9: 0

## 2025-04-15 ASSESSMENT — ENCOUNTER SYMPTOMS
VOMITING: 0
BACK PAIN: 0
SHORTNESS OF BREATH: 0
EYE PAIN: 0
SINUS PRESSURE: 0
BLOOD IN STOOL: 0
RHINORRHEA: 0
CONSTIPATION: 0
SORE THROAT: 0
DIARRHEA: 0
NAUSEA: 0
COUGH: 0
ABDOMINAL PAIN: 0

## 2025-04-15 NOTE — PROGRESS NOTES
HPI Notes    Name: Alex Fry  : 1985         Chief Complaint:     Chief Complaint   Patient presents with    Health Maintenance       History of Present Illness:        HPI  Pt is a 38 y/o M who presents for annual physical. Pt denies any new concerns. Pt reports his maternal grandfather had colorectal cancer and his mother had many benign polyps and one fast growing malignant polyp that was removed. Pt endorses seeing an eye doctor and dentist. Pt endorses starting walking and lifting regimen. Pt endorses trying to eat a healthy diet and be better with eating choices.     Past Medical History:     No past medical history on file.   Reviewed all health maintenance requirements and ordered appropriate tests  Health Maintenance Due   Topic Date Due    Varicella vaccine (1 of  - 13+ 2-dose series) Never done    HIV screen  Never done    Hepatitis B vaccine (1 of 3 - 19+ 3-dose series) Never done    COVID-19 Vaccine (2024- season) Never done       Past Surgical History:     Past Surgical History:   Procedure Laterality Date    FOOT SURGERY Right 10/24/2024    FOOT TENDON TRANSFER REPAIR-RIGHT ANTERIOR TALOFIBULAR LIGAMENT RECONSTRUCTION, OPEN REPAIR PERONEAL TENDON TEAR, OPEN REPAIR PERONEAL SUBLUXATION performed by Jr Martel DPM at [facility] OR    WRIST SURGERY Right         Medications:       Prior to Admission medications    Medication Sig Start Date End Date Taking? Authorizing Provider   albuterol sulfate HFA (VENTOLIN HFA) 108 (90 Base) MCG/ACT inhaler Inhale 2 puffs into the lungs every 4 hours as needed for Wheezing 25  Yes Radha Farrar, DO   Multiple Vitamins-Minerals (MULTIVITAMIN PO) Take 1 tablet by mouth daily   Yes Provider, MD Adelfo        Allergies:       Seasonal    Social History:     Tobacco:    reports that he quit smoking about 9 years ago. His smoking use included cigarettes and cigars. He started smoking about 25 years ago. He has a 8.2 pack-year

## 2025-04-15 NOTE — PATIENT INSTRUCTIONS
SURVEY:    You may be receiving a survey from [facility] regarding your visit today.    You may get this in the mail, through your MyChart or in your email.     Please complete the survey to enable us to provide the highest quality of care to you and your family.    If you cannot score us as very good ( 5 Stars) on any question, please feel free to call the office to discuss how we could have made your experience exceptional.     Thank you.    Clinical Care Team:         GARO Alexander-CRISTINE Barbosa [facility]    Clerical Team:  Elaina Lind       [place] [phone]           Billing questions: [phone]           Medical Records Request: [phone]

## 2025-04-16 ENCOUNTER — OFFICE VISIT (OUTPATIENT)
Dept: SURGERY | Age: 40
End: 2025-04-16
Payer: COMMERCIAL

## 2025-04-16 VITALS
HEIGHT: 70 IN | SYSTOLIC BLOOD PRESSURE: 128 MMHG | DIASTOLIC BLOOD PRESSURE: 82 MMHG | BODY MASS INDEX: 36.65 KG/M2 | WEIGHT: 256 LBS

## 2025-04-16 DIAGNOSIS — Z12.11 COLON CANCER SCREENING: Primary | ICD-10-CM

## 2025-04-16 PROCEDURE — 99202 OFFICE O/P NEW SF 15 MIN: CPT | Performed by: SURGERY

## 2025-04-16 NOTE — PROGRESS NOTES
No previous colonoscopy.    Maternal grandfather colon cancer about 56 yo.     He has 6 maternal aunts and uncles have not had colon cancer.    His mother may have had an \"aggressive polyp\".  He is not sure exactly what type of polyp.    He is not having any symptoms.  No abdominal pain, rectal bleeding, change in bowel habit.      We discussed whether or not he should begin screening based on his family history earlier than 45.      - His MGF is not a first degree relative, and none of the people in the next generation (his mothers) have had colon cancer.  - His mother has a history of an \"aggressive polyp\", but apparently has not had repeat colonoscopies.  So I am not sure what that means.  It could imply she had an advanced polyp.    I recommend try to find out exactly what his mother had. The best way to do that is get her pathology reports associated with any polyp removals, and share them with us.  I can review the report. If she did have an advance polyp then beginning screening before 45 is reasonable. He is going to obtain that information for us. I will review it and make a recommendation.

## 2025-05-29 ENCOUNTER — OFFICE VISIT (OUTPATIENT)
Dept: FAMILY MEDICINE CLINIC | Age: 40
End: 2025-05-29
Payer: COMMERCIAL

## 2025-05-29 VITALS
HEART RATE: 84 BPM | OXYGEN SATURATION: 98 % | BODY MASS INDEX: 35.73 KG/M2 | SYSTOLIC BLOOD PRESSURE: 120 MMHG | DIASTOLIC BLOOD PRESSURE: 80 MMHG | WEIGHT: 249 LBS

## 2025-05-29 DIAGNOSIS — M25.562 ACUTE PAIN OF LEFT KNEE: Primary | ICD-10-CM

## 2025-05-29 PROCEDURE — 99213 OFFICE O/P EST LOW 20 MIN: CPT | Performed by: FAMILY MEDICINE

## 2025-05-29 RX ORDER — PREDNISONE 20 MG/1
20 TABLET ORAL DAILY
Qty: 5 TABLET | Refills: 0 | Status: SHIPPED | OUTPATIENT
Start: 2025-05-29 | End: 2025-06-03

## 2025-05-29 NOTE — PROGRESS NOTES
HPI Notes    Name: Alex Fry  : 1985        Chief Complaint:     Chief Complaint   Patient presents with    Knee Pain     Patient here today with left knee pain and swelling. Noticed after playing basketball last night.  He said he always has trouble with his knee.        History of Present Illness:     Alex Fry is a 39 y.o.  male who presents with Knee Pain (Patient here today with left knee pain and swelling. Noticed after playing basketball last night.  He said he always has trouble with his knee. )      Knee Pain   Injury mechanism: Several years ago LCL ligament strain or ?tear and healed. Pt has since HS sports had knee pain.  Last night knee was sore and then played some basketball with son last night and pain much worse. Pt has been walking more to lose weight he gained. The pain is present in the left knee. The quality of the pain is described as aching. Pertinent negatives include no numbness or tingling. Treatments tried: ice and took Motrin.       Past Medical History:     History reviewed. No pertinent past medical history.   Reviewed all health maintenance requirements and ordered appropriate tests  Health Maintenance Due   Topic Date Due    Varicella vaccine (1 of 2 - 13+ 2-dose series) Never done    HIV screen  Never done    Hepatitis B vaccine (1 of 3 - 19+ 3-dose series) Never done    COVID-19 Vaccine ( season) Never done       Past Surgical History:     Past Surgical History:   Procedure Laterality Date    FOOT SURGERY Right 10/24/2024    FOOT TENDON TRANSFER REPAIR-RIGHT ANTERIOR TALOFIBULAR LIGAMENT RECONSTRUCTION, OPEN REPAIR PERONEAL TENDON TEAR, OPEN REPAIR PERONEAL SUBLUXATION performed by Jr Martel DPM at [facility] OR    WRIST SURGERY Right         Medications:       Prior to Admission medications    Medication Sig Start Date End Date Taking? Authorizing Provider   predniSONE (DELTASONE) 20 MG tablet Take 1 tablet by mouth daily for 5 days

## 2025-05-29 NOTE — PATIENT INSTRUCTIONS
SURVEY:    You may be receiving a survey from [employer] regarding your visit today.    Please complete the survey to enable us to provide the highest quality of care to you and your family.      Thank you.    Clinical Care Team: MD Troy Muñiz LPN              Triage: Elaina Stark CMA              Clerical Team: Elaina Lind

## 2025-06-05 NOTE — OP NOTE
Called for CODE BLUE     Patient admitted for cellulitis and volume overload, placed on CIWA for ETOH use (5-6 beers daily, last drink 6/2) however had not required medication prior (most recent prior CIWA Score 0, highest score 9). Patient reportedly found w/o pulse, received 1 round of compressions, ROSC achieved prior to ACLS meds or defibrillations. Uncertain if true loss of pulse, however immediately following apparent ROSC patient demonstrated seizure-like activity, loss of continence. Patient then demonstrated apparent post-ictal phase following seizure activity, remained confused/obtunded throughout entire RRT. Multiple episodes of emesis, O2 sats did remain >95%. Was given IV Versed x2, difficult IV access and will plan to load with phenobarbital once reliable IV access can be established. Also noted to have significant tachycardia 170s, does have hx of Afib and suspect RVR precipitated by seizure and emesis, HR did improve to 90s, cont home BB. Case d/w Intensivist and will plan for transfer to ICU at this time, patient currently protecting airway however will need close monitoring of respiratory status.    Total critical care time spent, excluding time for procedures: 48 minutes    Anais Camacho MD   6/5/25,  7:40 PM EDT    
with Irrisept antimicrobial wash.  Layered closure of the incision site was performed using 3-0 Monocryl 3-0 Prolene for subcutaneous compartment skin closure.  Patient was placed in a Betadine nonadherent dressing with application shortly posterior splint.  Patient tolerated procedure well was transferred to PACU in stable condition all questions concerns regarding postop management were addressed with family present.    Jr Martel DPM  [facility] [employer] [facility]   10/24/2024

## (undated) DEVICE — SYRINGE,EAR/ULCER, 2 OZ, STERILE: Brand: MEDLINE

## (undated) DEVICE — SUTURE PROL SZ 4-0 L30IN NONABSORBABLE BLU SH L26MM 1/2 CIR 8831H

## (undated) DEVICE — DRESSING PETRO W3XL8IN OIL EMUL N ADH GZ KNIT IMPREG CELOS

## (undated) DEVICE — UNDERGLOVE BIOGEL ULTRATOUCH S INDICATOR SZ 8

## (undated) DEVICE — BANDAGE COMPR W4INXL5YD WHT BGE POLY COT M E WRP WV HK AND

## (undated) DEVICE — SPONGE LAP W18XL18IN WHT COT 4 PLY FLD STRUNG RADPQ DISP ST 2 PER PACK

## (undated) DEVICE — SPLINT ORTH DBL SIDE 15 FTX4 IN RL PADDED FIBERGLASS LF

## (undated) DEVICE — TOWEL,OR,DSP,ST,NATURAL,DLX,4/PK,20PK/CS: Brand: MEDLINE

## (undated) DEVICE — BLADE SAW SAG OSCIL LNG MED 9X31MM

## (undated) DEVICE — BANDAGE,GAUZE,BULKEE II,4.5"X4.1YD,STRL: Brand: MEDLINE

## (undated) DEVICE — Device

## (undated) DEVICE — DRAPE,U/ SHT,SPLIT,PLAS,STERIL: Brand: MEDLINE

## (undated) DEVICE — GLOVE ORANGE PI 8   MSG9080

## (undated) DEVICE — BLADE, TONGUE, 6", STERILE: Brand: MEDLINE

## (undated) DEVICE — GOWN,SIRUS,POLYRNF,BRTHSLV,2XL,18/CS: Brand: MEDLINE

## (undated) DEVICE — BLADE,STAINLESS-STEEL,15,STRL,DISPOSABLE: Brand: MEDLINE

## (undated) DEVICE — TOWEL SURG SM W12XL18IN CLR PLAS TEAR RESIST REINF ADH FRST

## (undated) DEVICE — 5.5MM ALPHAVENT AND 4.75MM HARD BONE OMEGA DRILL: Brand: ALPHAVENT, OMEGA

## (undated) DEVICE — 3M™ STERI-DRAPE™ U-DRAPE 1015: Brand: STERI-DRAPE™

## (undated) DEVICE — MERCY TIFFIN LOWER EXTREMITY: Brand: MEDLINE INDUSTRIES, INC.

## (undated) DEVICE — SUTURE MONOCRYL + SZ 3-0 L27IN ABSRB UD PS1 L24MM 3/8 CIR REV MCP936H

## (undated) DEVICE — GLOVE SURG SZ 7 L12IN FNGR THK79MIL GRN LTX FREE

## (undated) DEVICE — SOLUTION IRRIG 1000ML 0.9% SOD CHL USP POUR PLAS BTL

## (undated) DEVICE — UNDERGLOVE SURG SZ 7 BLU LTX FREE SYN POLYISOPRENE POLYMER

## (undated) DEVICE — TUBING, SUCTION, 9/32" X 12', STRAIGHT: Brand: MEDLINE INDUSTRIES, INC.

## (undated) DEVICE — ZIMMER® STERILE DISPOSABLE TOURNIQUET CUFF WITH PROTECTIVE SLEEVE AND PLC, SINGLE PORT, SINGLE BLADDER, 34 IN. (86 CM)

## (undated) DEVICE — SUTURE VICRYL SZ 2-0 L27IN ABSRB UD L26MM SH 1/2 CIR J417H